# Patient Record
Sex: MALE | Race: OTHER | Employment: OTHER | URBAN - METROPOLITAN AREA
[De-identification: names, ages, dates, MRNs, and addresses within clinical notes are randomized per-mention and may not be internally consistent; named-entity substitution may affect disease eponyms.]

---

## 2017-01-09 ENCOUNTER — OFFICE VISIT (OUTPATIENT)
Dept: FAMILY MEDICINE CLINIC | Age: 82
End: 2017-01-09

## 2017-01-09 VITALS
TEMPERATURE: 97.7 F | DIASTOLIC BLOOD PRESSURE: 70 MMHG | HEART RATE: 56 BPM | SYSTOLIC BLOOD PRESSURE: 110 MMHG | OXYGEN SATURATION: 95 % | WEIGHT: 152.4 LBS | BODY MASS INDEX: 23.17 KG/M2 | RESPIRATION RATE: 18 BRPM

## 2017-01-09 DIAGNOSIS — N40.0 BENIGN NON-NODULAR PROSTATIC HYPERPLASIA WITHOUT LOWER URINARY TRACT SYMPTOMS: ICD-10-CM

## 2017-01-09 DIAGNOSIS — R20.2 NUMBNESS AND TINGLING IN BOTH HANDS: ICD-10-CM

## 2017-01-09 DIAGNOSIS — E11.8 TYPE 2 DIABETES MELLITUS WITH COMPLICATION, WITHOUT LONG-TERM CURRENT USE OF INSULIN (HCC): Primary | ICD-10-CM

## 2017-01-09 DIAGNOSIS — R20.0 NUMBNESS AND TINGLING IN BOTH HANDS: ICD-10-CM

## 2017-01-09 DIAGNOSIS — G25.0 BENIGN ESSENTIAL TREMOR: ICD-10-CM

## 2017-01-09 DIAGNOSIS — E78.5 HYPERLIPIDEMIA, UNSPECIFIED HYPERLIPIDEMIA TYPE: ICD-10-CM

## 2017-01-09 DIAGNOSIS — G56.03 BILATERAL CARPAL TUNNEL SYNDROME: ICD-10-CM

## 2017-01-09 PROCEDURE — 99214 OFFICE O/P EST MOD 30 MIN: CPT | Performed by: FAMILY MEDICINE

## 2017-01-11 LAB
A/G RATIO: 1.4 (CALC) (ref 1–2.5)
ALBUMIN SERPL-MCNC: 4 G/DL (ref 3.6–5.1)
ALP BLD-CCNC: 66 U/L (ref 40–115)
ALT SERPL-CCNC: 14 U/L (ref 9–46)
AST SERPL-CCNC: 17 U/L (ref 10–35)
ATYPICAL LYMPHOCYTE RELATIVE PERCENT: ABNORMAL % (ref 0–10)
BAND NEUTROPHILS: ABNORMAL %
BANDED NEUTROPHILS ABSOLUTE COUNT: ABNORMAL CELLS/UL (ref 0–750)
BASOPHILS ABSOLUTE: 40 CELLS/UL (ref 0–200)
BASOPHILS RELATIVE PERCENT: 0.5 %
BILIRUB SERPL-MCNC: 0.9 MG/DL (ref 0.2–1.2)
BLASTS ABSOLUTE COUNT: ABNORMAL CELLS/UL
BLASTS RELATIVE PERCENT: ABNORMAL %
BUN / CREAT RATIO: 23 (CALC) (ref 6–22)
BUN BLDV-MCNC: 26 MG/DL (ref 7–25)
CALCIUM SERPL-MCNC: 9.2 MG/DL (ref 8.6–10.3)
CHLORIDE BLD-SCNC: 100 MMOL/L (ref 98–110)
CHOLESTEROL, TOTAL: 181 MG/DL (ref 125–200)
CHOLESTEROL/HDL RATIO: 3.2 (CALC)
CHOLESTEROL: 125 MG/DL (CALC)
CO2: 35 MMOL/L (ref 20–31)
COMMENT: ABNORMAL
CREAT SERPL-MCNC: 1.13 MG/DL (ref 0.7–1.11)
EOSINOPHILS ABSOLUTE: 232 CELLS/UL (ref 15–500)
EOSINOPHILS RELATIVE PERCENT: 2.9 %
GFR AFRICAN AMERICAN: 65 ML/MIN/1.73M2
GFR SERPL CREATININE-BSD FRML MDRD: 57 ML/MIN/1.73M2
GLOBULIN: 2.9 G/DL (CALC) (ref 1.9–3.7)
GLUCOSE BLD-MCNC: 110 MG/DL (ref 65–99)
HBA1C MFR BLD: 5.9 % OF TOTAL HGB
HCT VFR BLD CALC: 41.5 % (ref 38.5–50)
HDLC SERPL-MCNC: 56 MG/DL
HEMOGLOBIN: 13.6 G/DL (ref 13.2–17.1)
LDL CHOLESTEROL CALCULATED: 110 MG/DL (CALC)
LYMPHOCYTES ABSOLUTE: 2104 CELLS/UL (ref 850–3900)
LYMPHOCYTES RELATIVE PERCENT: 26.3 %
MCH RBC QN AUTO: 32.9 PG (ref 27–33)
MCHC RBC AUTO-ENTMCNC: 32.8 G/DL (ref 32–36)
MCV RBC AUTO: 100.5 FL (ref 80–100)
METAMYELOCYTES ABSOLUTE COUNT: ABNORMAL CELLS/UL
METAMYELOCYTES RELATIVE PERCENT: ABNORMAL %
MONOCYTES ABSOLUTE: 680 CELLS/UL (ref 200–950)
MONOCYTES RELATIVE PERCENT: 8.5 %
MYELOCYTE PERCENT: ABNORMAL %
MYELOCYTES ABSOLUTE COUNT: ABNORMAL CELLS/UL
NEUTROPHILS ABSOLUTE: 4944 CELLS/UL (ref 1500–7800)
NUCLEATED RED BLOOD CELLS: ABNORMAL /100 WBC
NUCLEATED RED BLOOD CELLS: ABNORMAL CELLS/UL
PDW BLD-RTO: 13.9 % (ref 11–15)
PLATELET # BLD: 141 THOUSAND/UL (ref 140–400)
PMV BLD AUTO: 10.7 FL (ref 7.5–11.5)
POTASSIUM SERPL-SCNC: 4.1 MMOL/L (ref 3.5–5.3)
PROMYELOCYTES ABSOLUTE COUNT: ABNORMAL CELLS/UL
PROMYELOCYTES PERCENT: ABNORMAL %
RBC # BLD: 4.13 MILLION/UL (ref 4.2–5.8)
SEGMENTED NEUTROPHILS RELATIVE PERCENT: 61.8 %
SODIUM BLD-SCNC: 140 MMOL/L (ref 135–146)
TOTAL PROTEIN: 6.9 G/DL (ref 6.1–8.1)
TRIGL SERPL-MCNC: 74 MG/DL
TSH ULTRASENSITIVE: 1.84 MIU/L (ref 0.4–4.5)
VITAMIN D 25-HYDROXY: 39 NG/ML (ref 30–100)
WBC # BLD: 8 THOUSAND/UL (ref 3.8–10.8)

## 2017-02-22 ENCOUNTER — TELEPHONE (OUTPATIENT)
Dept: FAMILY MEDICINE CLINIC | Age: 82
End: 2017-02-22

## 2017-07-10 ENCOUNTER — OFFICE VISIT (OUTPATIENT)
Dept: FAMILY MEDICINE CLINIC | Age: 82
End: 2017-07-10

## 2017-07-10 VITALS
HEART RATE: 47 BPM | WEIGHT: 145 LBS | TEMPERATURE: 96.8 F | HEIGHT: 67 IN | SYSTOLIC BLOOD PRESSURE: 147 MMHG | DIASTOLIC BLOOD PRESSURE: 64 MMHG | OXYGEN SATURATION: 99 % | RESPIRATION RATE: 16 BRPM | BODY MASS INDEX: 22.76 KG/M2

## 2017-07-10 DIAGNOSIS — E11.9 TYPE 2 DIABETES MELLITUS WITHOUT COMPLICATION, WITHOUT LONG-TERM CURRENT USE OF INSULIN (HCC): Primary | ICD-10-CM

## 2017-07-10 DIAGNOSIS — G25.0 BENIGN ESSENTIAL TREMOR: ICD-10-CM

## 2017-07-10 DIAGNOSIS — E78.5 HYPERLIPIDEMIA, UNSPECIFIED HYPERLIPIDEMIA TYPE: ICD-10-CM

## 2017-07-10 DIAGNOSIS — N40.0 BENIGN NON-NODULAR PROSTATIC HYPERPLASIA WITHOUT LOWER URINARY TRACT SYMPTOMS: ICD-10-CM

## 2017-07-10 DIAGNOSIS — G20 PARKINSONISM, UNSPECIFIED PARKINSONISM TYPE (HCC): ICD-10-CM

## 2017-07-10 LAB
CREATININE URINE POCT: NORMAL
MICROALBUMIN/CREAT 24H UR: NEGATIVE MG/G{CREAT}
MICROALBUMIN/CREAT UR-RTO: NORMAL

## 2017-07-10 PROCEDURE — 4040F PNEUMOC VAC/ADMIN/RCVD: CPT | Performed by: FAMILY MEDICINE

## 2017-07-10 PROCEDURE — 99214 OFFICE O/P EST MOD 30 MIN: CPT | Performed by: FAMILY MEDICINE

## 2017-07-10 PROCEDURE — G8420 CALC BMI NORM PARAMETERS: HCPCS | Performed by: FAMILY MEDICINE

## 2017-07-10 PROCEDURE — G8427 DOCREV CUR MEDS BY ELIG CLIN: HCPCS | Performed by: FAMILY MEDICINE

## 2017-07-10 PROCEDURE — 82044 UR ALBUMIN SEMIQUANTITATIVE: CPT | Performed by: FAMILY MEDICINE

## 2017-07-10 PROCEDURE — 1123F ACP DISCUSS/DSCN MKR DOCD: CPT | Performed by: FAMILY MEDICINE

## 2017-07-10 PROCEDURE — 1036F TOBACCO NON-USER: CPT | Performed by: FAMILY MEDICINE

## 2017-07-10 ASSESSMENT — PATIENT HEALTH QUESTIONNAIRE - PHQ9
2. FEELING DOWN, DEPRESSED OR HOPELESS: 0
SUM OF ALL RESPONSES TO PHQ9 QUESTIONS 1 & 2: 0
1. LITTLE INTEREST OR PLEASURE IN DOING THINGS: 0
SUM OF ALL RESPONSES TO PHQ QUESTIONS 1-9: 0

## 2017-07-14 LAB
A/G RATIO: 1.5 (CALC) (ref 1–2.5)
ALBUMIN SERPL-MCNC: 3.8 G/DL (ref 3.6–5.1)
ALP BLD-CCNC: 54 U/L (ref 40–115)
ALT SERPL-CCNC: 11 U/L (ref 9–46)
AST SERPL-CCNC: 16 U/L (ref 10–35)
BILIRUB SERPL-MCNC: 0.8 MG/DL (ref 0.2–1.2)
BUN / CREAT RATIO: 22 (CALC) (ref 6–22)
BUN BLDV-MCNC: 25 MG/DL (ref 7–25)
CALCIUM SERPL-MCNC: 9.1 MG/DL (ref 8.6–10.3)
CHLORIDE BLD-SCNC: 104 MMOL/L (ref 98–110)
CHOLESTEROL, TOTAL: 185 MG/DL (ref 125–200)
CHOLESTEROL/HDL RATIO: 2.9 (CALC)
CHOLESTEROL: 122 MG/DL (CALC)
CO2: 32 MMOL/L (ref 20–31)
CREAT SERPL-MCNC: 1.13 MG/DL (ref 0.7–1.11)
GFR AFRICAN AMERICAN: 65 ML/MIN/1.73M2
GFR SERPL CREATININE-BSD FRML MDRD: 57 ML/MIN/1.73M2
GLOBULIN: 2.6 G/DL (CALC) (ref 1.9–3.7)
GLUCOSE BLD-MCNC: 109 MG/DL (ref 65–99)
HBA1C MFR BLD: 5.8 % OF TOTAL HGB
HDLC SERPL-MCNC: 63 MG/DL
LDL CHOLESTEROL CALCULATED: 110 MG/DL (CALC)
POTASSIUM SERPL-SCNC: 4.2 MMOL/L (ref 3.5–5.3)
SODIUM BLD-SCNC: 141 MMOL/L (ref 135–146)
TOTAL PROTEIN: 6.4 G/DL (ref 6.1–8.1)
TRIGL SERPL-MCNC: 60 MG/DL

## 2017-07-20 LAB — DIABETIC RETINOPATHY: NEGATIVE

## 2017-10-18 ENCOUNTER — OFFICE VISIT (OUTPATIENT)
Dept: ORTHOPEDIC SURGERY | Age: 82
End: 2017-10-18

## 2017-10-18 VITALS — BODY MASS INDEX: 22.76 KG/M2 | WEIGHT: 145 LBS | HEIGHT: 67 IN

## 2017-10-18 DIAGNOSIS — M54.2 NECK PAIN: ICD-10-CM

## 2017-10-18 DIAGNOSIS — M25.512 LEFT SHOULDER PAIN, UNSPECIFIED CHRONICITY: ICD-10-CM

## 2017-10-18 DIAGNOSIS — M50.30 DEGENERATIVE DISC DISEASE, CERVICAL: Primary | ICD-10-CM

## 2017-10-18 PROCEDURE — 4040F PNEUMOC VAC/ADMIN/RCVD: CPT | Performed by: ORTHOPAEDIC SURGERY

## 2017-10-18 PROCEDURE — G8427 DOCREV CUR MEDS BY ELIG CLIN: HCPCS | Performed by: ORTHOPAEDIC SURGERY

## 2017-10-18 PROCEDURE — 1123F ACP DISCUSS/DSCN MKR DOCD: CPT | Performed by: ORTHOPAEDIC SURGERY

## 2017-10-18 PROCEDURE — 1036F TOBACCO NON-USER: CPT | Performed by: ORTHOPAEDIC SURGERY

## 2017-10-18 PROCEDURE — 99203 OFFICE O/P NEW LOW 30 MIN: CPT | Performed by: ORTHOPAEDIC SURGERY

## 2017-10-18 PROCEDURE — 72050 X-RAY EXAM NECK SPINE 4/5VWS: CPT | Performed by: ORTHOPAEDIC SURGERY

## 2017-10-18 PROCEDURE — G8420 CALC BMI NORM PARAMETERS: HCPCS | Performed by: ORTHOPAEDIC SURGERY

## 2017-10-18 PROCEDURE — 73030 X-RAY EXAM OF SHOULDER: CPT | Performed by: ORTHOPAEDIC SURGERY

## 2017-10-18 PROCEDURE — G8484 FLU IMMUNIZE NO ADMIN: HCPCS | Performed by: ORTHOPAEDIC SURGERY

## 2017-10-18 RX ORDER — PROPRANOLOL HYDROCHLORIDE 80 MG/1
TABLET ORAL
COMMUNITY
Start: 2014-05-15 | End: 2018-01-08 | Stop reason: CLARIF

## 2017-10-18 NOTE — LETTER
Patient Name: Nia Samuel MRN: [id number]  DOS: 10/18/2017   Diagnosis:   1. Degenerative disc disease, cervical    2. Left shoulder pain, unspecified chronicity    3. Neck pain                           Goal:  Decrease Pain and/or Swelling Increase ROM and/or Flexibility     Increase Function                           Increase Strength and/or Endurance   Other   Evaluation:  Evaluation and Treatment KT-1000   Isokinetic Exam   Preoperative Eval    Recommended Modalities:  Modalities of Choice      HCVS            Electrical Stimulation      Remove Dressing  Ultrasound        TENS/TNS     Lumbar Traction            Cervical Traction   Phonophoresis         Hot Pack/Cold Pack   PT Treatment, Unlisted Other:  Therapeutic Exercises:    Isometrics    Range of Motion Progressive Exer. Balance Coordination   Flexibility  ROM Limited  Total Hip Replacement   Passive  ROM Full   Total Knee Replacement  Active Assisted    Shoulder Impingement Prog  Active   Tennis Elbow Program   Capsular Shift Regular        Isokinetics      Spine Program   Straight Leg Raises   Gait    Fixation                    Supine                                               Running    Extension    Prone    Throwing    Stabilization    AB     Swiss Ball    AD       Spine Eval    Cervical Eval   Conditioning    Lumbar   Stationary Bike    [place] Track    Lumbar Exer. Stairmaster          Treadmill    Functional Cap  Aquatic Prog.       Return to work    Treatment Program:  Frequency:  1x   2x   3x   4x   5x week/month  Duration:  1   2   3   4   5 week/month  Weight Bearing:  Non   1/4   1/2   3/4   Full  ROM:  Restricted   Full   Follow established:         Other:

## 2017-10-19 ENCOUNTER — HOSPITAL ENCOUNTER (OUTPATIENT)
Dept: PHYSICAL THERAPY | Age: 82
Discharge: HOME | End: 2017-10-31
Admitting: ORTHOPAEDIC SURGERY

## 2017-10-19 PROBLEM — M50.30 DDD (DEGENERATIVE DISC DISEASE), CERVICAL: Status: ACTIVE | Noted: 2017-10-01

## 2017-10-19 NOTE — FLOWSHEET NOTE
[facility]  Orthopaedics and Sports Rehabilitation, [address] [place] [address]  Phone: [phone]   Fax:     [phone]                                                     Physical Therapy Daily Treatment Note  Date:  10/19/2017    Patient Name:  Hayde Kahn    :  10/23/1925  MRN: [id number]  Restrictions/Precautions:    Medical/Treatment Diagnosis Information:  · Diagnosis: M50.30 (ICD-10-CM) - Degenerative disc disease, cervical   · Treatment Diagnosis: Neck pain/left shoulder pain    Insurance/Certification information:  PT Insurance Information: [employer]   Physician Information:  Referring Practitioner: Dr. Pravin Gonzalez of care signed (Y/N):     Date of Patient follow up with Physician:     G-Code (if applicable):      Date G-Code Applied:  10/19/17  PT G-Codes  Functional Assessment Tool Used: NDI  Score: 6% deficit   Functional Limitation: Changing and maintaining body position  Changing and Maintaining Body Position Current Status ():  At least 1 percent but less than 20 percent impaired, limited or restricted  Changing and Maintaining Body Position Goal Status (): 0 percent impaired, limited or restricted    Progress Note:     Next due by: Visit #10      Latex Allergy:  [x]NO      []YES  Preferred Language for Healthcare:   [x]English       []other:    Visit # Insurance Allowable   1 Unlimited      Pain level:  2-610     SUBJECTIVE:  See eval    OBJECTIVE: See eval  Observation:   Test measurements:      RESTRICTIONS/PRECAUTIONS:     Exercises/Interventions:   Exercise/Equipment Resistance/Repetitions Other comments   Stretching/PROM     CROM     Chin tuck Supine 10x5\" hold    UT side bend stretch     Levater scap stretch     Scalene stretch     Pectoral stretch 3x30\"               Isometrics     Retraction     shrugs     Cervical Flexion      Cervical Extension     Cervical sidebending 10x5\" hold bilat              PRE's work      Manual Treatments:  PROM / STM / Oscillations-Mobs:  G-I, II, III, IV (PA's, Inf., Post.)  [x] (23086) Provided manual therapy to mobilize soft tissue/joints of cervical/CT, scapular GHJ and UE for the purpose of decreasing headache, modulating pain, promoting relaxation,  increasing ROM, reducing/eliminating soft tissue swelling/inflammation/restriction, improving soft tissue extensibility and allowing for proper ROM for normal function with self care, reaching, carrying, lifting, house/yardwork, driving/computer work    Modalities:      Charges:  Timed Code Treatment Minutes: 25  TE MT   Total Treatment Minutes: 50  Eval (low)       [x] EVAL (LOW) 57490 (typically 20 minutes face-to-face)  [] EVAL (MOD) 18816 (typically 30 minutes face-to-face)  [] EVAL (HIGH) 91224 (typically 45 minutes face-to-face)  [] RE-EVAL     [x] BK(58608) x  1   [] IONTO  [] NMR (44280) x      [] VASO  [x] Manual (21751) x  1    [] Other:  [] TA x       [] Mech Traction (24786)  [] ES(attended) (22679)      [] ES (un) (08924):     GOALS:  Patient stated goal: Get rid of pain; be able to exercise without pain     Therapist goals for Patient:   Short Term Goals: To be achieved in: 2 weeks  1. Independent in HEP and progression per patient tolerance, in order to prevent re-injury. 2. Patient will have a decrease in pain to facilitate improvement in movement, function, and ADLs as indicated by Functional Deficits. Long Term Goals: To be achieved in: 6 weeks  1. Disability index score of 3% or less for the NDI to assist with reaching prior level of function. 2. Patient will demonstrate increased AROM to [facility] of cervical/thoracic spine to allow for proper joint functioning as indicated by patients Functional Deficits. 3. Patient will demonstrate an increase in postural awareness and control and activation of  Deep cervical stabilizers to allow for proper functional mobility as indicated by patients Functional Deficits.    4. Patient will return to daily functional activities without increased symptoms or restriction. Progression Towards Functional goals:  [] Patient is progressing as expected towards functional goals listed. [] Progression is slowed due to complexities listed. [] Progression has been slowed due to co-morbidities. [x] Plan just implemented, too soon to assess goals progression  [] Other:     ASSESSMENT:  See eval    Treatment/Activity Tolerance:  [x] Patient tolerated treatment well [] Patient limited by fatique  [] Patient limited by pain  [] Patient limited by other medical complications  [] Other:     Patient education :  10/19: Patient education on PT and plan of care including diagnosis, prognosis, treatment goals and options. Patient agrees with discussed POC and treatment and is aware of rehab process. Pt was also educated on clinic layout and use of modalities. Prognosis: [x] Good [] Fair  [] Poor    Patient Requires Follow-up: [x] Yes  [] No    PLAN: 2x per week for 4 weeks.  10/19/17-11/19/17  [] Continue per plan of care [] Alter current plan (see comments)  [x] Plan of care initiated [] Hold pending MD visit [] Discharge    Electronically signed by: Perla Claire PT

## 2017-10-19 NOTE — PLAN OF CARE
1+/3     Functional Mobility/Transfers: WFL     Posture: increased thoracic kyphosis, rounded shoulders and forward head posture     Gait: (include devices/WB status): WNL                        [x] Patient history, allergies, meds reviewed. Medical chart reviewed. See intake form. Review Of Systems (ROS):  [x]Performed Review of systems (Integumentary, CardioPulmonary, Neurological) by intake and observation. Intake form has been scanned into medical record. Patient has been instructed to contact their primary care physician regarding ROS issues if not already being addressed at this time.       Co-morbidities/Complexities (which will affect course of rehabilitation):  [x]None           Arthritic conditions   []Rheumatoid arthritis (M05.9)  []Osteoarthritis (M19.91)   Cardiovascular conditions   []Hypertension (I10)  []Hyperlipidemia (E78.5)  []Angina pectoris (I20)  []Atherosclerosis (I70)   Musculoskeletal conditions   []Disc pathology   []Congenital spine pathologies   []Prior surgical intervention  []Osteoporosis (M81.8)  []Osteopenia (M85.8)   Endocrine conditions   []Hypothyroid (E03.9)  []Hyperthyroid Gastrointestinal conditions   []Constipation (U49.87)   Metabolic conditions   []Morbid obesity (E66.01)  []Diabetes type 1(E10.65) or 2 (E11.65)   []Neuropathy (G60.9)     Pulmonary conditions   []Asthma (J45)  []Coughing   []COPD (J44.9)   Psychological Disorders  []Anxiety (F41.9)  []Depression (F32.9)   []Other:   []Other:          Barriers to/and or personal factors that will affect rehab potential:              [x]Age  [x]Sex              [x]Motivation/Lack of Motivation                        []Co-Morbidities              []Cognitive Function, education/learning barriers              []Environmental, home barriers              []profession/work barriers  []past PT/medical experience  []other:  Justification:     Falls Risk Assessment (30 days):   [x] Falls Risk assessed and no intervention required. [] Falls Risk assessed and Patient requires intervention due to being higher risk   TUG score (>12s at risk):     [] Falls education provided, including       G-Codes:  PT G-Codes  Functional Assessment Tool Used: NDI  Score: 6% deficit   Functional Limitation: Changing and maintaining body position  Changing and Maintaining Body Position Current Status (): At least 1 percent but less than 20 percent impaired, limited or restricted  Changing and Maintaining Body Position Goal Status (): 0 percent impaired, limited or restricted    ASSESSMENT: Patient is a 80year old male who presents with decreased functional mobility and strength of neck and scapular region due to degenerative disc disease of cervical spine. Expectations, POC and diagnosis was reviewed with patient. Patient reports understanding of all education provided and is in agreement with plan of care. Patient will benefit from skilled physical therapy to address outlined problems and goals.      Functional Impairments:     [x]Noted cervical/thoracic/GHJ joint hypomobility   []Noted cervical/thoracic/GHJ joint hypermobility   [x]Decreased cervical/UE functional ROM   []Noted Headache pain aggravated by neck movements with/without dizziness   [x]Abnormal reflexes/sensation/myotomal/dermatomal deficits   [x]Decreased DCF control or ability to hold head up   [x]Decreased RC/scapular/core strength and neuromuscular control    []Decreased UE functional strength   []other:      Functional Activity Limitations (from functional questionnaire and intake)   [x]Reduced ability to tolerate prolonged functional positions   []Reduced ability or difficulty with changes of positions or transfers between positions   [x]Reduced ability to maintain good posture and demonstrate good body mechanics with sitting, bending, and lifting   [x] Reduced ability or tolerance with driving and/or computer work   [x]Reduced ability to perform lifting, reaching, present problem with:  [] no personal factors and/or comorbidities that impact the plan of care;  [x]1-2 personal factors and/or comorbidities that impact the plan of care  []3 personal factors and/or comorbidities that impact the plan of care  [x] An examination of body systems using standardized tests and measures addressing any of the following: body structures and functions (impairments), activity limitations, and/or participation restrictions;:  [] a total of 1-2 or more elements   [] a total of 3 or more elements   [x] a total of 4 or more elements   [x] A clinical presentation with:  [x] stable and/or uncomplicated characteristics   [] evolving clinical presentation with changing characteristics  [] unstable and unpredictable characteristics;   [x] Clinical decision making of [x] low, [] moderate, [] high complexity using standardized patient assessment instrument and/or measurable assessment of functional outcome. [x] EVAL (LOW) 92951 (typically 20 minutes face-to-face)  [] EVAL (MOD) 33049 (typically 30 minutes face-to-face)  [] EVAL (HIGH) 67623 (typically 45 minutes face-to-face)  [] RE-EVAL     PLAN:   Frequency/Duration:  2 days per week for 4 Weeks:  Interventions:  [x]  Therapeutic exercise including: strength training, ROM, for cervical spine,scapula, core and Upper extremity, including postural re-education. [x]  NMR activation and proprioception for Deep cervical flexors, periscapular and RC muscles and Core, including postural re-education. [x]  Manual therapy as indicated for C/T spine, ribs, Soft tissue to include: Dry Needling/IASTM, STM, PROM, Gr I-IV mobilizations, manipulation. [x] Modalities as needed that may include: thermal agents, E-stim, Biofeedback, US, iontophoresis as indicated  [x] Patient education on joint protection, postural re-education, activity modification, progression of HEP.      HEP instruction: Provided written and verbal instructions (see scanned forms)    GOALS:  Patient stated goal: Get rid of pain; be able to exercise without pain     Therapist goals for Patient:   Short Term Goals: To be achieved in: 2 weeks  1. Independent in HEP and progression per patient tolerance, in order to prevent re-injury. 2. Patient will have a decrease in pain to facilitate improvement in movement, function, and ADLs as indicated by Functional Deficits. Long Term Goals: To be achieved in: 6 weeks  1. Disability index score of 3% or less for the NDI to assist with reaching prior level of function. 2. Patient will demonstrate increased AROM to [facility] of cervical/thoracic spine to allow for proper joint functioning as indicated by patients Functional Deficits. 3. Patient will demonstrate an increase in postural awareness and control and activation of  Deep cervical stabilizers to allow for proper functional mobility as indicated by patients Functional Deficits. 4. Patient will return to daily functional activities without increased symptoms or restriction.       Electronically signed by:  Sharon Bernstein PT

## 2017-10-19 NOTE — PROGRESS NOTES
70-year-old man seen for evaluation of left shoulder pain. Patient very pleasant 80year-old gentleman complains of pain in his left arm radiating down the lateral aspect of his arm. He is referred for evaluation of her shoulder. He denies any specific injuries. He says that he feels better when he uses the new pillow that he bought not this helps with his shoulder pain. Also says that when he sits with his head back in a more upright position that his shoulder feels better.     Pain Assessment  Location of Pain: Arm  Location Modifiers: Left  Severity of Pain: 4  Quality of Pain: Aching  Duration of Pain: Persistent  Frequency of Pain: Intermittent  Aggravating Factors:  (certain movement )  Limiting Behavior: Yes  Relieving Factors: Rest  Result of Injury: No  Work-Related Injury: No  Are there other pain locations you wish to document?: No    Past Medical History:   Diagnosis Date    Benign essential tremor     BPH (benign prostatic hyperplasia)     Glaucoma (increased eye pressure)     Hyperlipidemia     Parkinsonism (HCC)     Type II or unspecified type diabetes mellitus without mention of complication, not stated as uncontrolled         Past Surgical History:   Procedure Laterality Date    CATARACT REMOVAL Left     CHOLECYSTECTOMY  2000    HERNIA REPAIR      left inguinal    NASAL FRACTURE SURGERY         Family History   Problem Relation Age of Onset    Other Mother      appendicitis -    [employer] Heart Attack Father 76    Other Sister      mysathia gravis    Glaucoma Brother        Social History     Social History    Marital status:      Spouse name: N/A    Number of children: N/A    Years of education: N/A     Social History Main Topics    Smoking status: Never Smoker    Smokeless tobacco: Never Used    Alcohol use 0.0 oz/week    Drug use: No    Sexual activity: Not Asked     Other Topics Concern    None     Social History Narrative    None       Current Outpatient Prescriptions   Medication Sig Dispense Refill    propranolol (INDERAL) 80 MG tablet       atorvastatin (LIPITOR) 20 MG tablet TAKE 1 TABLET DAILY (Patient taking differently: TAKE 1/2  TABLET DAILY) 90 tablet 0    finasteride (PROSCAR) 5 MG tablet Take 5 mg by mouth daily.  aspirin 81 MG tablet Take 81 mg by mouth daily.  Glucose Blood (BLOOD GLUCOSE TEST STRIPS) STRP Check Blood sugars qd 100 strip 5    Blood Glucose Monitoring Suppl AGUSTINA by Does not apply route. 1 Device 0    Lancets MISC  100 each 5    propranolol (INDERAL LA) 80 MG CR capsule Take 80 mg by mouth 4 times daily.  terazosin (HYTRIN) 2 MG capsule Take 2 mg by mouth nightly.  Multiple Vitamin (MULTI-VITAMIN PO) Take 1 tablet by mouth daily. No current facility-administered medications for this visit. No Known Allergies    Vital signs:  Ht 5' 7\" (1.702 m)   Wt 145 lb (65.8 kg)   BMI 22.71 kg/m²        Neuro: Alert & oriented x 3,  normal,  no focal deficits noted. Normal affect. Eyes: sclera clear  Ears: Normal external ear  Mouth:  No perioral lesions  Pulm: Respirations unlabored and regular  Pulse: Regular rate and rhythm   Skin: Warm, well perfused      Examination of the patient's neck shows limited range of motion with positive Spurling signs bilaterally mild paraspinous muscle tenderness bilaterally. Normal sensation throughout both upper extremity is. No focal lordosis. Right Shoulder Examination:    Inspection: No abnormal swelling. No erythema. No induration. Palpation: No tenderness to palpation. No palpable masses. No crepitus. Acromioclavicular joint: Nontender to palpation. Range of Motion: Full range of motion. Strength: Normal rotator cuff strength. Normal scapulothoracic rhythm. Instability: No anterior or posterior instability. Special Tests: Negative biceps findings. Negative Neer and Kacie signs. Negative apprehension sign.   Negative [place] sign. Negative thrower's sign. Neurovascular: Skin warm well perfused. Normal sensation to light touch. Left Shoulder Examination:    Inspection: No abnormal swelling. No erythema. No induration. Palpation: No tenderness to palpation. No palpable masses. No crepitus. Acromioclavicular joint: Nontender to palpation. Range of Motion: Full range of motion. Strength: Normal rotator cuff strength. Normal scapulothoracic rhythm. Instability: No anterior or posterior instability. Special Tests: Negative biceps findings. Negative Neer and Kacie signs. Negative apprehension sign. Negative [place] sign. Negative thrower's sign. Neurovascular: Skin warm well perfused. Normal sensation to light touch. 3 x-rays were obtained of the right shoulder today. They include an AP, axillary and outlet views. I reviewed the films today. No acute bony abnormalities. AP and lateral x-rays of cervical spine reviewed today show extensive cervical spondylosis with degenerative disc disease. Impression: Advanced cervical spondylosis without evidence of focal deficit. Referred pain to shoulder. Plan: Physical therapy for patient's cervical spondylosis. A couple weeks with this and agrees tight getting any better he'll be referred to a physical medicine rehabilitation specialist for consideration of possible steroid injection. Greater than 50% of the visit was spent counseling the patient. I personally reviewed the patient's pain scale, review of systems, family history, social history, past medical history, allergies and medications. 13 point review of systems was collected today and is included in the medical record. Carey Gongora MD  Sports Medicine, Knee and Shoulder Surgery    This dictation was performed with a verbal recognition program [facility]) and it was checked for errors.   It is possible that there are still dictated errors within this

## 2017-10-23 ENCOUNTER — HOSPITAL ENCOUNTER (OUTPATIENT)
Dept: PHYSICAL THERAPY | Age: 82
Discharge: HOME | End: 2017-10-23

## 2017-10-23 NOTE — FLOWSHEET NOTE
[facility]  Orthopaedics and Sports Rehabilitation, [address] [place] [address], [address] [place] [address]  Phone: [phone]   Fax:     [phone]                                                     Physical Therapy Daily Treatment Note  Date:  10/23/2017    Patient Name:  Hedy Driver    :  10/23/1925  MRN: [id number]  Restrictions/Precautions:    Medical/Treatment Diagnosis Information:  · Diagnosis: M50.30 (ICD-10-CM) - Degenerative disc disease, cervical   · Treatment Diagnosis: Neck pain/left shoulder pain    Insurance/Certification information:  PT Insurance Information: [employer]   Physician Information:  Referring Practitioner: Dr. Swetha Galindo Way of care signed (Y/N):     Date of Patient follow up with Physician:     G-Code (if applicable):      Date G-Code Applied:  10/19/17       Progress Note:     Next due by: Visit #10      Latex Allergy:  [x]NO      []YES  Preferred Language for Healthcare:   [x]English       []other:    Visit # Insurance Allowable   2  10/23 Unlimited      Pain level:  2/10 10/23    SUBJECTIVE:  Pt reports that he is still having pain at the elbow, but less tingling radiating into arm. 10/23    OBJECTIVE: 10/23  Observation: Noted cervical spine SB to R in resting posture with noted increases in R sided cervical musculature.  10/23  Test measurements:  PIVM assessment shows significant hypomobility in cervical spine all levels 10/23    RESTRICTIONS/PRECAUTIONS:     Exercises/Interventions:   Exercise/Equipment Resistance/Repetitions Other comments   Stretching/PROM     CROM     Chin tuck Supine 10x5\" hold    UT side bend stretch     Levater scap stretch     TS Extension/ Rotation with cane NPV if tolerated    Pectoral stretch 3x30\"     TS Extension x20 over chair 10/23   CS Circles x10 CW CCW Discontinued part way due to return of elbow symptoms 10/23   Isometrics     CS Retraction Seated 10x5\" hold   10/23   shrugs     Cervical Flexion Cervical Extension     Cervical sidebending 10x5\" hold bilat              PRE's     External Rotation     Internal Rotation     Serratus     Biceps     Triceps     Shrugs     Horizontal Abd with ER     Reverse Flys     EXT     Flexion     Abduction          Cable Column/Theraband     Scapular Retraction 3x10 no resistance Trial resistance NPV    External Rotation     Internal Rotation     Ext     TRIC     Lats     Shrugs     Flex     BIC     PNF          Manual intervention Manual distraction/ NG ulnar/radial/medial 10'                   Therapeutic Exercise and NMR EXR  [x] (20730) Provided verbal/tactile cueing for activities related to strengthening, flexibility, endurance, ROM  for improvements in cervical, postural, scapular, scapulothoracic and UE control with self care, reaching, carrying, lifting, house/yardwork, driving/computer work. [x] (62382) Provided verbal/tactile cueing for activities related to improving balance, coordination, kinesthetic sense, posture, motor skill, proprioception  to assist with cervical, scapular, scapulothoracic and UE control with self care, reaching, carrying, lifting, house/yardwork, driving/computer work. Therapeutic Activities:    [x] (86468 or 98928) Provided verbal/tactile cueing for activities related to improving balance, coordination, kinesthetic sense, posture, motor skill, proprioception and motor activation to allow for proper function of cervical, scapular, scapulothoracic and UE control with self care, carrying, lifting, driving/computer work.      Home Exercise Program:    [x] (00419) Reviewed/Progressed HEP activities related to strengthening, flexibility, endurance, ROM of cervical, scapular, scapulothoracic and UE control with self care, reaching, carrying, lifting, house/yardwork, driving/computer work  [] (34758) Reviewed/Progressed HEP activities related to improving balance, coordination, kinesthetic sense, posture, motor skill, proprioception of activation of  Deep cervical stabilizers to allow for proper functional mobility as indicated by patients Functional Deficits. 4. Patient will return to daily functional activities without increased symptoms or restriction. Progression Towards Functional goals:  [] Patient is progressing as expected towards functional goals listed. [] Progression is slowed due to complexities listed. [] Progression has been slowed due to co-morbidities. [x] Plan just implemented, too soon to assess goals progression  [] Other:     ASSESSMENT:  Pt tolerated treatment well with a notable decrease in symptoms with posture correction and seated retractions. Pt reported return of symptoms with cervical spine circles and Ulnar NG warranting discontinued intervention. POC to continue to address postural corrections while decreasing and managing radicular symptoms. 10/23. Treatment/Activity Tolerance:  [x] Patient tolerated treatment well [] Patient limited by fatique  [] Patient limited by pain  [] Patient limited by other medical complications  [] Other:     Patient education :  10/19: Patient education on PT and plan of care including diagnosis, prognosis, treatment goals and options. Patient agrees with discussed POC and treatment and is aware of rehab process. Pt was also educated on clinic layout and use of modalities. 10/23- Pt educated on maintaining proper posture and checking in as often as he can throughout the day to assess his posture and make corrections. Prognosis: [x] Good [] Fair  [] Poor    Patient Requires Follow-up: [x] Yes  [] No    PLAN: 2x per week for 4 weeks.  10/19/17-11/19/17  [x] Continue per plan of care [] Alter current plan (see comments)  [] Plan of care initiated [] Hold pending MD visit [] Discharge    Electronically signed by: Pb Pang PT     Mis [place], Student Physical Therapist  Therapist was present, directed the patient's care, made skilled judgement, and was responsible for assessment and treatment of the patient.

## 2017-10-25 ENCOUNTER — HOSPITAL ENCOUNTER (OUTPATIENT)
Dept: PHYSICAL THERAPY | Age: 82
Discharge: HOME | End: 2017-10-25

## 2017-10-25 NOTE — FLOWSHEET NOTE
Cervical sidebending 10x5\" hold bilat seated and supine attempted *discontinued due to radicular pain in all positions 10/25             PRE's     External Rotation     Internal Rotation     Serratus     Biceps     Triceps     Shrugs     Horizontal Abd with ER     Reverse Flys     EXT     Flexion     Abduction          Cable Column/Theraband        External Rotation     Internal Rotation     Ext     TRIC     Lats     Shrugs     Flex     BIC     PNF          Manual intervention Manual [id number]                   Therapeutic Exercise and NMR EXR  [x] (19716) Provided verbal/tactile cueing for activities related to strengthening, flexibility, endurance, ROM  for improvements in cervical, postural, scapular, scapulothoracic and UE control with self care, reaching, carrying, lifting, house/yardwork, driving/computer work. [x] (63990) Provided verbal/tactile cueing for activities related to improving balance, coordination, kinesthetic sense, posture, motor skill, proprioception  to assist with cervical, scapular, scapulothoracic and UE control with self care, reaching, carrying, lifting, house/yardwork, driving/computer work. Therapeutic Activities:    [x] (38443 or 66905) Provided verbal/tactile cueing for activities related to improving balance, coordination, kinesthetic sense, posture, motor skill, proprioception and motor activation to allow for proper function of cervical, scapular, scapulothoracic and UE control with self care, carrying, lifting, driving/computer work.      Home Exercise Program:    [x] (80320) Reviewed/Progressed HEP activities related to strengthening, flexibility, endurance, ROM of cervical, scapular, scapulothoracic and UE control with self care, reaching, carrying, lifting, house/yardwork, driving/computer work  [] (93300) Reviewed/Progressed HEP activities related to improving balance, coordination, kinesthetic sense, posture, motor skill, proprioception of cervical, scapular, directed the patient's care, made skilled judgement, and was responsible for assessment and treatment of the patient.

## 2017-10-30 ENCOUNTER — HOSPITAL ENCOUNTER (OUTPATIENT)
Dept: PHYSICAL THERAPY | Age: 82
Discharge: HOME | End: 2017-10-30

## 2017-10-30 NOTE — FLOWSHEET NOTE
PRE's     External Rotation     Internal Rotation     Serratus     Biceps     Triceps     Shrugs     Horizontal Abd with ER     Reverse Flys     EXT     Flexion     Abduction          Cable Column/Theraband     Scapular Retraction 3x10 Red TB ^10/30   External Rotation     Internal Rotation     Ext 2x10 Red Added 10/30   TRIC     Lats     Shrugs     Flex     BIC     PNF          Manual intervention Manual distraction1/2nd rib mob 10'                   Therapeutic Exercise and NMR EXR  [x] (20854) Provided verbal/tactile cueing for activities related to strengthening, flexibility, endurance, ROM  for improvements in cervical, postural, scapular, scapulothoracic and UE control with self care, reaching, carrying, lifting, house/yardwork, driving/computer work. [x] (94007) Provided verbal/tactile cueing for activities related to improving balance, coordination, kinesthetic sense, posture, motor skill, proprioception  to assist with cervical, scapular, scapulothoracic and UE control with self care, reaching, carrying, lifting, house/yardwork, driving/computer work. Therapeutic Activities:    [x] (30848 or 73758) Provided verbal/tactile cueing for activities related to improving balance, coordination, kinesthetic sense, posture, motor skill, proprioception and motor activation to allow for proper function of cervical, scapular, scapulothoracic and UE control with self care, carrying, lifting, driving/computer work.      Home Exercise Program:    [x] (44401) Reviewed/Progressed HEP activities related to strengthening, flexibility, endurance, ROM of cervical, scapular, scapulothoracic and UE control with self care, reaching, carrying, lifting, house/yardwork, driving/computer work  [] (51917) Reviewed/Progressed HEP activities related to improving balance, coordination, kinesthetic sense, posture, motor skill, proprioception of cervical, scapular, scapulothoracic and UE control with self care, reaching, carrying, lifting, house/yardwork, driving/computer work      Manual Treatments:  PROM / STM / Oscillations-Mobs:  G-I, II, III, IV (PA's, Inf., Post.)  [x] (35511) Provided manual therapy to mobilize soft tissue/joints of cervical/CT, scapular GHJ and UE for the purpose of decreasing headache, modulating pain, promoting relaxation,  increasing ROM, reducing/eliminating soft tissue swelling/inflammation/restriction, improving soft tissue extensibility and allowing for proper ROM for normal function with self care, reaching, carrying, lifting, house/yardwork, driving/computer work    Modalities:      Charges:  Timed Code Treatment Minutes: 40  TE MT TA   Total Treatment Minutes: 3:00-3:45       [] EVAL (LOW) 69393 (typically 20 minutes face-to-face)  [] EVAL (MOD) 32144 (typically 30 minutes face-to-face)  [] EVAL (HIGH) 33321 (typically 45 minutes face-to-face)  [] RE-EVAL     [x] JK(74500) x  1   [] IONTO  [] NMR (27266) x      [] VASO  [x] Manual (22264) x  1    [] Other:  [x] TA x  1    [] Mech Traction (16504)  [] ES(attended) (56080)      [] ES (un) (80318):     GOALS:  Patient stated goal: Get rid of pain; be able to exercise without pain     Therapist goals for Patient:   Short Term Goals: To be achieved in: 2 weeks  1. Independent in HEP and progression per patient tolerance, in order to prevent re-injury. 2. Patient will have a decrease in pain to facilitate improvement in movement, function, and ADLs as indicated by Functional Deficits. Long Term Goals: To be achieved in: 6 weeks  1. Disability index score of 3% or less for the NDI to assist with reaching prior level of function. 2. Patient will demonstrate increased AROM to [facility] of cervical/thoracic spine to allow for proper joint functioning as indicated by patients Functional Deficits.    3. Patient will demonstrate an increase in postural awareness and control and activation of  Deep cervical stabilizers to allow for proper functional mobility as indicated by patients Functional Deficits. 4. Patient will return to daily functional activities without increased symptoms or restriction. Progression Towards Functional goals:  [] Patient is progressing as expected towards functional goals listed. [] Progression is slowed due to complexities listed. [] Progression has been slowed due to co-morbidities. [x] Plan just implemented, too soon to assess goals progression  [] Other:     ASSESSMENT:  Pt tolerated treatment well with a significant reduction in muscle tension noted after manual intervention. In addition he tolerated the addition of resistance exercises with notable fatigue at the end resulting in only 2 sets of extensions. 10/30    Treatment/Activity Tolerance:  [x] Patient tolerated treatment well [] Patient limited by fatique  [] Patient limited by pain  [] Patient limited by other medical complications  [] Other:     Patient education :  10/19: Patient education on PT and plan of care including diagnosis, prognosis, treatment goals and options. Patient agrees with discussed POC and treatment and is aware of rehab process. Pt was also educated on clinic layout and use of modalities. 10/23- Pt educated on maintaining proper posture and checking in as often as he can throughout the day to assess his posture and make corrections. 10/25- Pt instructed to no longer continue with SB ISO at home and Tspine rotations were added to HEP. Importance of posture was reiterated. Prognosis: [x] Good [] Fair  [] Poor    Patient Requires Follow-up: [x] Yes  [] No    PLAN: 2x per week for 4 weeks.  10/19/17-11/19/17  [x] Continue per plan of care [] Alter current plan (see comments)  [] Plan of care initiated [] Hold pending MD visit [] Discharge    Electronically signed by: Kerrie Lugo, PT     Noman Queen, Student Physical Therapist  Therapist was present, directed the patient's care, made skilled judgement, and was responsible for assessment and treatment of the patient.

## 2017-11-01 ENCOUNTER — HOSPITAL ENCOUNTER (OUTPATIENT)
Dept: PHYSICAL THERAPY | Age: 82
Discharge: HOME | End: 2017-11-01
Admitting: ORTHOPAEDIC SURGERY

## 2017-11-01 ENCOUNTER — HOSPITAL ENCOUNTER (OUTPATIENT)
Dept: PHYSICAL THERAPY | Age: 82
Discharge: HOME | End: 2017-11-30
Attending: ORTHOPAEDIC SURGERY | Admitting: ORTHOPAEDIC SURGERY

## 2017-11-01 NOTE — FLOWSHEET NOTE
[facility]  Orthopaedics and Sports Rehabilitation, [address] [place] [address], [address] [place] [address]  Phone: [phone]   Fax:     [phone]                                                     Physical Therapy Daily Treatment Note  Date:  2017    Patient Name:  Quan Coelho    :  10/23/1925  MRN: [id number]  Restrictions/Precautions:    Medical/Treatment Diagnosis Information:  · Diagnosis: M50.30 (ICD-10-CM) - Degenerative disc disease, cervical   · Treatment Diagnosis: Neck pain/left shoulder pain    Insurance/Certification information:  PT Insurance Information: [employer]   Physician Information:  Referring Practitioner: Dr. Caroline Salinas of care signed (Y/N):     Date of Patient follow up with Physician:     G-Code (if applicable):      Date G-Code Applied:  10/19/17       Progress Note:     Next due by: Visit #10      Latex Allergy:  [x]NO      []YES  Preferred Language for Healthcare:   [x]English       []other:    Visit # Insurance Allowable   5   Unlimited      Pain level:  0/10     SUBJECTIVE:  Pt reports he is feeling a little stiff this morning but still feels general improvement and has little sensation down his arm. He has a cold and that is making him feel lousy      OBJECTIVE:   Observation: Noted cervical spine SB to R in resting posture with noted increases in R sided cervical musculature.    Test measurements:  Manual traction in conjunction with mild L side bending proved maked reduction in resting Cspine position and .     RESTRICTIONS/PRECAUTIONS:     Exercises/Interventions:   Exercise/Equipment Resistance/Repetitions Other comments   Stretching/PROM     CROM Flexion and SB x10 each Added    UT side bend stretch     Levater scap stretch     TS Extension/ Rotation with cane x30 rotations Added 10/25   Pectoral stretch 3x30\"  Reviewed technique with pt today to decrease elbow symptoms 10/30   TS Extension x20 over chair 10/23   Isometrics     shrugs     Cervical Flexion      Cervical Extension               PRE's     External Rotation     Internal Rotation     Serratus     Biceps     Triceps     Shrugs     Horizontal Abd with ER     Reverse Flys     EXT     Flexion     Abduction          Cable Column/Theraband     Scapular Retraction 3x10 Red TB ^10/30   External Rotation     Internal Rotation     Ext 3x10 Red ^10/30   TRIC     Lats     Shrugs     Flex     BIC     PNF          Manual intervention Manual distraction 10'                   Therapeutic Exercise and NMR EXR  [x] (32001) Provided verbal/tactile cueing for activities related to strengthening, flexibility, endurance, ROM  for improvements in cervical, postural, scapular, scapulothoracic and UE control with self care, reaching, carrying, lifting, house/yardwork, driving/computer work. [x] (92526) Provided verbal/tactile cueing for activities related to improving balance, coordination, kinesthetic sense, posture, motor skill, proprioception  to assist with cervical, scapular, scapulothoracic and UE control with self care, reaching, carrying, lifting, house/yardwork, driving/computer work. Therapeutic Activities:    [x] (30417 or 36151) Provided verbal/tactile cueing for activities related to improving balance, coordination, kinesthetic sense, posture, motor skill, proprioception and motor activation to allow for proper function of cervical, scapular, scapulothoracic and UE control with self care, carrying, lifting, driving/computer work.      Home Exercise Program:    [x] (90742) Reviewed/Progressed HEP activities related to strengthening, flexibility, endurance, ROM of cervical, scapular, scapulothoracic and UE control with self care, reaching, carrying, lifting, house/yardwork, driving/computer work  [] (90754) Reviewed/Progressed HEP activities related to improving balance, coordination, kinesthetic sense, posture, motor skill, proprioception of cervical, scapular, scapulothoracic and UE control with self care, reaching, carrying, lifting, house/yardwork, driving/computer work      Manual Treatments:  PROM / STM / Oscillations-Mobs:  G-I, II, III, IV (Yadira, Inf., Post.)  [x] (09121) Provided manual therapy to mobilize soft tissue/joints of cervical/CT, scapular GHJ and UE for the purpose of decreasing headache, modulating pain, promoting relaxation,  increasing ROM, reducing/eliminating soft tissue swelling/inflammation/restriction, improving soft tissue extensibility and allowing for proper ROM for normal function with self care, reaching, carrying, lifting, house/yardwork, driving/computer work    Modalities:      Charges:  Timed Code Treatment Minutes: 35  TE TA   Total Treatment Minutes: 11:31-12:15       [] EVAL (LOW) 42852 (typically 20 minutes face-to-face)  [] EVAL (MOD) 64388 (typically 30 minutes face-to-face)  [] EVAL (HIGH) 30614 (typically 45 minutes face-to-face)  [] RE-EVAL     [x] (14901) x  1   [] IONTO  [] NMR (45357) x      [] VASO  [] Manual (74562) x       [] Other:   [x] TA x  1    [] Mech Traction (24679)  [] ES(attended) (90442)      [] ES (un) (80989):     GOALS:  Patient stated goal: Get rid of pain; be able to exercise without pain     Therapist goals for Patient:   Short Term Goals: To be achieved in: 2 weeks  1. Independent in HEP and progression per patient tolerance, in order to prevent re-injury. 2. Patient will have a decrease in pain to facilitate improvement in movement, function, and ADLs as indicated by Functional Deficits. Long Term Goals: To be achieved in: 6 weeks  1. Disability index score of 3% or less for the NDI to assist with reaching prior level of function. 2. Patient will demonstrate [facility]/thoracic spine to allow for proper joint functioning as indicated by patients Functional Deficits.    3. Patient will demonstrate an increase in postural awareness and control and activation of  Deep cervical stabilizers to allow for proper functional mobility as indicated by patients Functional Deficits. 4. Patient will return to daily functional activities without increased symptoms or restriction. Progression Towards Functional goals:  [] Patient is progressing as expected towards functional goals listed. [] Progression is slowed due to complexities listed. [] Progression has been slowed due to co-morbidities. [x] Plan just implemented, too soon to assess goals progression  [] Other:     ASSESSMENT:  Pt tolerated treatment well with a significant reduction in muscle tension and improvement is cervical positioning noted after manual intervention. No reports of pain or sensation through the entirety of treatment session. Pt continues to tolerate resistance exercise. 11/1    Treatment/Activity Tolerance:  [x] Patient tolerated treatment well [] Patient limited by fatique  [] Patient limited by pain  [] Patient limited by other medical complications  [] Other:     Patient education :  10/19: Patient education on PT and plan of care including diagnosis, prognosis, treatment goals and options. Patient agrees with discussed POC and treatment and is aware of rehab process. Pt was also educated on clinic layout and use of modalities. 10/23- Pt educated on maintaining proper posture and checking in as often as he can throughout the day to assess his posture and make corrections. 10/25- Pt instructed to no longer continue with SB ISO at home and Tspine rotations were added to HEP. Importance of posture was reiterated. Prognosis: [x] Good [] Fair  [] Poor    Patient Requires Follow-up: [x] Yes  [] No    PLAN: 2x per week for 4 weeks.  10/19/17-11/19/17 Pt instructed to FU once next week to check in since he has been progressing well  [x] Continue per plan of care [] Alter current plan (see comments)  [] Plan of care initiated [] Hold pending MD visit [] Discharge    Electronically signed by: Rizwan Hahn, PT Niharika Donahue, Student Physical Therapist  Therapist was present, directed the patient's care, made skilled judgement, and was responsible for assessment and treatment of the patient.

## 2017-11-08 ENCOUNTER — HOSPITAL ENCOUNTER (OUTPATIENT)
Dept: PHYSICAL THERAPY | Age: 82
Discharge: HOME | End: 2017-11-08
Admitting: ORTHOPAEDIC SURGERY

## 2017-11-08 NOTE — FLOWSHEET NOTE
driving/computer work      Manual Treatments:  PROM / STM / Oscillations-Mobs:  G-I, II, III, IV (PA's, Inf., Post.)  [x] (35088) Provided manual therapy to mobilize soft tissue/joints of cervical/CT, scapular GHJ and UE for the purpose of decreasing headache, modulating pain, promoting relaxation,  increasing ROM, reducing/eliminating soft tissue swelling/inflammation/restriction, improving soft tissue extensibility and allowing for proper ROM for normal function with self care, reaching, carrying, lifting, house/yardwork, driving/computer work    Modalities:      Charges:  Timed Code Treatment Minutes: 25  TE MT    Total Treatment Minutes: 4750-6804       [] EVAL (LOW) 95741 (typically 20 minutes face-to-face)  [] EVAL (MOD) 08774 (typically 30 minutes face-to-face)  [] EVAL (HIGH) 84605 (typically 45 minutes face-to-face)  [] RE-EVAL     [x] HB(18243) x  1   [] IONTO  [] NMR (20986) x      [] VASO  [x] Manual (83430) x  1    [] Other:   [] TA x       [] Mech Traction (30404)  [] ES(attended) (73644)      [] ES (un) (60117):     GOALS:  Patient stated goal: Get rid of pain; be able to exercise without pain     Therapist goals for Patient:   Short Term Goals: To be achieved in: 2 weeks  1. Independent in HEP and progression per patient tolerance, in order to prevent re-injury. 2. Patient will have a decrease in pain to facilitate improvement in movement, function, and ADLs as indicated by Functional Deficits. Long Term Goals: To be achieved in: 6 weeks  1. Disability index score of 3% or less for the NDI to assist with reaching prior level of function. 2. Patient will demonstrate increased AROM to [facility] of cervical/thoracic spine to allow for proper joint functioning as indicated by patients Functional Deficits.    3. Patient will demonstrate an increase in postural awareness and control and activation of  Deep cervical stabilizers to allow for proper functional mobility as indicated by patients Functional Deficits. 4. Patient will return to daily functional activities without increased symptoms or restriction. Progression Towards Functional goals:  [] Patient is progressing as expected towards functional goals listed. [] Progression is slowed due to complexities listed. [] Progression has been slowed due to co-morbidities. [x] Plan just implemented, too soon to assess goals progression  [] Other:     ASSESSMENT:  Pt tolerated treatment well with no reports of pain or sensation through the entirety of treatment session. 11/8    Treatment/Activity Tolerance:  [x] Patient tolerated treatment well [] Patient limited by fatique  [] Patient limited by pain  [] Patient limited by other medical complications  [] Other:     Patient education :  10/19: Patient education on PT and plan of care including diagnosis, prognosis, treatment goals and options. Patient agrees with discussed POC and treatment and is aware of rehab process. Pt was also educated on clinic layout and use of modalities. 10/23- Pt educated on maintaining proper posture and checking in as often as he can throughout the day to assess his posture and make corrections. 10/25- Pt instructed to no longer continue with SB ISO at home and Tspine rotations were added to HEP. Importance of posture was reiterated. Prognosis: [x] Good [] Fair  [] Poor    Patient Requires Follow-up: [x] Yes  [] No    PLAN: 2x per week for 4 weeks. 10/19/17-11/19/17 Pt instructed to FU once next week to check in since he has been progressing well.    [x] Continue per plan of care [] Alter current plan (see comments)  [] Plan of care initiated [] Hold pending MD visit [] Discharge    Electronically signed by: Sophie Brwon, PT

## 2017-12-01 ENCOUNTER — HOSPITAL ENCOUNTER (OUTPATIENT)
Dept: PHYSICAL THERAPY | Age: 82
Discharge: HOME | End: 2017-12-31
Attending: ORTHOPAEDIC SURGERY | Admitting: ORTHOPAEDIC SURGERY

## 2018-01-08 ENCOUNTER — OFFICE VISIT (OUTPATIENT)
Dept: FAMILY MEDICINE CLINIC | Age: 83
End: 2018-01-08

## 2018-01-08 VITALS
RESPIRATION RATE: 16 BRPM | DIASTOLIC BLOOD PRESSURE: 80 MMHG | BODY MASS INDEX: 22.24 KG/M2 | OXYGEN SATURATION: 98 % | SYSTOLIC BLOOD PRESSURE: 122 MMHG | WEIGHT: 142 LBS | HEART RATE: 50 BPM

## 2018-01-08 DIAGNOSIS — E78.5 HYPERLIPIDEMIA, UNSPECIFIED HYPERLIPIDEMIA TYPE: ICD-10-CM

## 2018-01-08 DIAGNOSIS — G20 PARKINSONISM, UNSPECIFIED PARKINSONISM TYPE (HCC): ICD-10-CM

## 2018-01-08 DIAGNOSIS — E11.9 TYPE 2 DIABETES MELLITUS WITHOUT COMPLICATION, WITHOUT LONG-TERM CURRENT USE OF INSULIN (HCC): Primary | ICD-10-CM

## 2018-01-08 DIAGNOSIS — G20 PARKINSON DISEASE (HCC): ICD-10-CM

## 2018-01-08 DIAGNOSIS — M50.30 DDD (DEGENERATIVE DISC DISEASE), CERVICAL: ICD-10-CM

## 2018-01-08 DIAGNOSIS — G25.0 BENIGN ESSENTIAL TREMOR: ICD-10-CM

## 2018-01-08 PROCEDURE — 4040F PNEUMOC VAC/ADMIN/RCVD: CPT | Performed by: FAMILY MEDICINE

## 2018-01-08 PROCEDURE — 1123F ACP DISCUSS/DSCN MKR DOCD: CPT | Performed by: FAMILY MEDICINE

## 2018-01-08 PROCEDURE — G8420 CALC BMI NORM PARAMETERS: HCPCS | Performed by: FAMILY MEDICINE

## 2018-01-08 PROCEDURE — 99214 OFFICE O/P EST MOD 30 MIN: CPT | Performed by: FAMILY MEDICINE

## 2018-01-08 PROCEDURE — G8484 FLU IMMUNIZE NO ADMIN: HCPCS | Performed by: FAMILY MEDICINE

## 2018-01-08 PROCEDURE — G8427 DOCREV CUR MEDS BY ELIG CLIN: HCPCS | Performed by: FAMILY MEDICINE

## 2018-01-08 PROCEDURE — 1036F TOBACCO NON-USER: CPT | Performed by: FAMILY MEDICINE

## 2018-01-08 RX ORDER — ATORVASTATIN CALCIUM 20 MG/1
TABLET, FILM COATED ORAL
Qty: 90 TABLET | Refills: 1 | Status: SHIPPED | OUTPATIENT
Start: 2018-01-08 | End: 2020-01-08

## 2018-01-08 NOTE — PROGRESS NOTES
SUBJECTIVE:  80 y.o.. male  for follow up of diabetes, hypertension, and hypercholesterolemia. Diabetic Review of Systems - medication compliance: compliant most of the time, diabetic diet compliance: compliant most of the time, home glucose monitoring: fasting values range - not checking, further diabetic ROS: no polyuria or polydipsia, no chest pain, dyspnea or TIA's, no numbness, tingling or pain in extremities, last eye exam approximately 7/17. He moved from his house to [place] in 9/1/17 . He planned his house in 1 day. He is adjusting okay . He commented on it being older people. He does go to the gym 4d/ week . He dries and is 1/4 of mile and walks on treadmill and does weights. He used to go to a gym. He is finding it to be a friendly environment. His wife is enjoying the move. His kids are relieved . He has 2 children in the area. 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 He gets 1/2 money back if leaves after 10 years. It was newly remodeled. He is taking Propanolol 80 mg qid for tremor. He is able to play golf without problems. He takes cart . He is to see Neurologist again. He has signed up for golf outing in [place] for 4 days in 6/17.  25 guys go on the [employer] group. He stopped Lipitor in early 6/17. He denies problems with the Lipitor. He just is \"anti- medication\". He was having tingling to arm, but it has resolved. Doing neck exercises fairly regularly he was given through Physical Therapy . He had seen Dr. Brandon Coulter prior to Physical Therapy . Current Outpatient Prescriptions   Medication Sig Dispense Refill    atorvastatin (LIPITOR) 20 MG tablet TAKE 1 TABLET DAILY (Patient taking differently: TAKE 1/2  TABLET DAILY) 90 tablet 0    aspirin 81 MG tablet Take 81 mg by mouth daily.  Multiple Vitamin (MULTI-VITAMIN PO) Take 1 tablet by mouth daily.         propranolol (INDERAL) 80 MG tablet       finasteride (PROSCAR) 5 MG Parkinsonism type ([facility] [address]        -    Followed by Neurologist.   DDD (degenerative disc disease), cervical        -    Stable. PLAN:  See orders for this visit as documented in the electronic medical record. Issues reviewed with her: low cholesterol diet, weight control and daily exercise discussed, home glucose monitoring emphasized, all medications, side effects and compliance discussed carefully, foot care discussed and Podiatry visits discussed, annual eye examinations at Ophthalmology discussed, glycohemoglobin and other lab monitoring discussed and long term diabetic complications discussed. Goals:   1) Blood pressure < 130/80  2) HGA1C ideal less than 6.5, at least less than 7.0  3) LDL cholesterol < 100  4) Exercise 150 minutes a week   5) Dilated eye exam by an optometrist or ophthalmologist once a year  6) Fasting blood sugar < 110  7) Glucose 2 hours after meal < 140  8) Aspirin 81 mg once a day  9) BMI (Body Mass Index) ideal < 25, at least less than 30. Weight loss of even 10 to 15 pounds is effective in improving diabetes  9) Total fat intake to less than 60 grams per day and saturated fat to less than 20 grams per day. 10) Diabetic education and diabetic nutrition classes. [phone]) Doctor visits every 3 months. Follow up 6 months/ prn.

## 2018-01-10 LAB
A/G RATIO: 1.4 (CALC) (ref 1–2.5)
ALBUMIN SERPL-MCNC: 3.9 G/DL (ref 3.6–5.1)
ALP BLD-CCNC: 53 U/L (ref 40–115)
ALT SERPL-CCNC: 12 U/L (ref 9–46)
AST SERPL-CCNC: 16 U/L (ref 10–35)
BILIRUB SERPL-MCNC: 0.9 MG/DL (ref 0.2–1.2)
BUN / CREAT RATIO: ABNORMAL (CALC) (ref 6–22)
BUN BLDV-MCNC: 22 MG/DL (ref 7–25)
CALCIUM SERPL-MCNC: 9.2 MG/DL (ref 8.6–10.3)
CHLORIDE BLD-SCNC: 105 MMOL/L (ref 98–110)
CHOLESTEROL, TOTAL: 199 MG/DL
CHOLESTEROL/HDL RATIO: 3.3 (CALC)
CHOLESTEROL: 139 MG/DL (CALC)
CO2: 32 MMOL/L (ref 20–31)
CREAT SERPL-MCNC: 1.06 MG/DL (ref 0.7–1.11)
GFR AFRICAN AMERICAN: 70 ML/MIN/1.73M2
GFR SERPL CREATININE-BSD FRML MDRD: 61 ML/MIN/1.73M2
GLOBULIN: 2.8 G/DL (CALC) (ref 1.9–3.7)
GLUCOSE BLD-MCNC: 120 MG/DL (ref 65–99)
HBA1C MFR BLD: 5.4 % OF TOTAL HGB
HDLC SERPL-MCNC: 60 MG/DL
LDL CHOLESTEROL CALCULATED: 121 MG/DL (CALC)
POTASSIUM SERPL-SCNC: 4 MMOL/L (ref 3.5–5.3)
SODIUM BLD-SCNC: 140 MMOL/L (ref 135–146)
TOTAL PROTEIN: 6.7 G/DL (ref 6.1–8.1)
TRIGL SERPL-MCNC: 84 MG/DL

## 2018-06-21 LAB — DIABETIC RETINOPATHY: NEGATIVE

## 2018-07-09 ENCOUNTER — OFFICE VISIT (OUTPATIENT)
Dept: FAMILY MEDICINE CLINIC | Age: 83
End: 2018-07-09

## 2018-07-09 VITALS
OXYGEN SATURATION: 98 % | WEIGHT: 139.6 LBS | HEART RATE: 50 BPM | HEIGHT: 67 IN | BODY MASS INDEX: 21.91 KG/M2 | DIASTOLIC BLOOD PRESSURE: 61 MMHG | RESPIRATION RATE: 12 BRPM | SYSTOLIC BLOOD PRESSURE: 136 MMHG

## 2018-07-09 DIAGNOSIS — Z23 NEED FOR SHINGLES VACCINE: ICD-10-CM

## 2018-07-09 DIAGNOSIS — E11.9 TYPE 2 DIABETES MELLITUS WITHOUT COMPLICATION, WITHOUT LONG-TERM CURRENT USE OF INSULIN (HCC): Primary | ICD-10-CM

## 2018-07-09 DIAGNOSIS — M50.30 DDD (DEGENERATIVE DISC DISEASE), CERVICAL: ICD-10-CM

## 2018-07-09 DIAGNOSIS — N40.0 BENIGN PROSTATIC HYPERPLASIA WITHOUT LOWER URINARY TRACT SYMPTOMS: ICD-10-CM

## 2018-07-09 DIAGNOSIS — E78.5 HYPERLIPIDEMIA, UNSPECIFIED HYPERLIPIDEMIA TYPE: ICD-10-CM

## 2018-07-09 DIAGNOSIS — G25.0 BENIGN ESSENTIAL TREMOR: ICD-10-CM

## 2018-07-09 DIAGNOSIS — Z12.5 PROSTATE CANCER SCREENING: ICD-10-CM

## 2018-07-09 DIAGNOSIS — G20 PARKINSONISM, UNSPECIFIED PARKINSONISM TYPE (HCC): ICD-10-CM

## 2018-07-09 PROCEDURE — 4040F PNEUMOC VAC/ADMIN/RCVD: CPT | Performed by: FAMILY MEDICINE

## 2018-07-09 PROCEDURE — G8427 DOCREV CUR MEDS BY ELIG CLIN: HCPCS | Performed by: FAMILY MEDICINE

## 2018-07-09 PROCEDURE — 1123F ACP DISCUSS/DSCN MKR DOCD: CPT | Performed by: FAMILY MEDICINE

## 2018-07-09 PROCEDURE — G8420 CALC BMI NORM PARAMETERS: HCPCS | Performed by: FAMILY MEDICINE

## 2018-07-09 PROCEDURE — 99214 OFFICE O/P EST MOD 30 MIN: CPT | Performed by: FAMILY MEDICINE

## 2018-07-09 PROCEDURE — 1036F TOBACCO NON-USER: CPT | Performed by: FAMILY MEDICINE

## 2018-07-09 NOTE — PROGRESS NOTES
SUBJECTIVE:  80 y.o.. male  for follow up of diabetes, hypertension, and hypercholesterolemia. Diabetic Review of Systems - medication compliance: compliant most of the time, diabetic diet compliance: compliant most of the time, home glucose monitoring: fasting values range - not checking , further diabetic ROS: no polyuria or polydipsia, no chest pain, dyspnea or TIA's, no numbness, tingling or pain in extremities, last eye exam approximately 3 weeks ago. He is only taking 1/2 of Lipitor 20 mg = 10 mg daily . He stopped,but restarted it 1 month ago. He does treadmill 4 d/ week - 40 minutes . He lives at [facility]. He has tingling to hands,but it has improved with exercises and it is not daily. He thinks his position sitting aggravates the pain. He is still golfing 1d/ week and goes to driving range 2-3 days/ week. He did not go on golfing trip this year due to his wife did not want him to go. His wife goes to exercise with him. Current Outpatient Prescriptions   Medication Sig Dispense Refill    atorvastatin (LIPITOR) 20 MG tablet TAKE 1 TABLET DAILY (Patient taking differently: TAKE 1 TABLET DAILY) 90 tablet 1    finasteride (PROSCAR) 5 MG tablet Take 5 mg by mouth daily.  aspirin 81 MG tablet Take 81 mg by mouth daily.  Glucose Blood (BLOOD GLUCOSE TEST STRIPS) STRP Check Blood sugars qd 100 strip 5    Blood Glucose Monitoring Suppl AGUSTINA by Does not apply route. 1 Device 0    Lancets MISC  100 each 5    propranolol (INDERAL LA) 80 MG CR capsule Take 80 mg by mouth 4 times daily.  terazosin (HYTRIN) 2 MG capsule Take 2 mg by mouth nightly.  Multiple Vitamin (MULTI-VITAMIN PO) Take 1 tablet by mouth daily. No current facility-administered medications for this visit. ROS: All other systems were reviewed and were negative . Patient's allergies and medications were reviewed.   Patient's past medical, surgical, social , and family history glucose monitoring emphasized, all medications, side effects and compliance discussed carefully, foot care discussed and Podiatry visits discussed, annual eye examinations at Ophthalmology discussed, glycohemoglobin and other lab monitoring discussed and long term diabetic complications discussed. Goals:   1) Blood pressure < 130/80  2) HGA1C ideal less than 6.5, at least less than 7.0  3) LDL cholesterol < 100  4) Exercise 150 minutes a week   5) Dilated eye exam by an optometrist or ophthalmologist once a year  6) Fasting blood sugar < 110  7) Glucose 2 hours after meal < 140  8) Aspirin 81 mg once a day  9) BMI (Body Mass Index) ideal < 25, at least less than 30. Weight loss of even 10 to 15 pounds is effective in improving diabetes  9) Total fat intake to less than 60 grams per day and saturated fat to less than 20 grams per day. 10) Diabetic education and diabetic nutrition classes. [phone]) Doctor visits every 3 months. Return in about 6 months (around 1/9/2019).

## 2018-07-12 LAB
A/G RATIO: 1.5 (CALC) (ref 1–2.5)
ALBUMIN SERPL-MCNC: 3.8 G/DL (ref 3.6–5.1)
ALP BLD-CCNC: 51 U/L (ref 40–115)
ALT SERPL-CCNC: 13 U/L (ref 9–46)
AST SERPL-CCNC: 17 U/L (ref 10–35)
BILIRUB SERPL-MCNC: 1.1 MG/DL (ref 0.2–1.2)
BUN / CREAT RATIO: ABNORMAL (CALC) (ref 6–22)
BUN BLDV-MCNC: 24 MG/DL (ref 7–25)
CALCIUM SERPL-MCNC: 9.5 MG/DL (ref 8.6–10.3)
CHLORIDE BLD-SCNC: 103 MMOL/L (ref 98–110)
CHOLESTEROL, TOTAL: 147 MG/DL
CHOLESTEROL/HDL RATIO: 2.3 (CALC)
CHOLESTEROL: 83 MG/DL (CALC)
CO2: 31 MMOL/L (ref 20–31)
CREAT SERPL-MCNC: 1.02 MG/DL (ref 0.7–1.11)
GFR AFRICAN AMERICAN: 74 ML/MIN/1.73M2
GFR SERPL CREATININE-BSD FRML MDRD: 64 ML/MIN/1.73M2
GLOBULIN: 2.6 G/DL (CALC) (ref 1.9–3.7)
GLUCOSE BLD-MCNC: 104 MG/DL (ref 65–99)
HBA1C MFR BLD: 5.6 % OF TOTAL HGB
HDLC SERPL-MCNC: 64 MG/DL
LDL CHOLESTEROL CALCULATED: 70 MG/DL (CALC)
POTASSIUM SERPL-SCNC: 4.3 MMOL/L (ref 3.5–5.3)
PROSTATE SPECIFIC ANTIGEN: 1.4 NG/ML
SODIUM BLD-SCNC: 140 MMOL/L (ref 135–146)
TOTAL PROTEIN: 6.4 G/DL (ref 6.1–8.1)
TRIGL SERPL-MCNC: 58 MG/DL

## 2018-07-20 ENCOUNTER — TELEPHONE (OUTPATIENT)
Dept: FAMILY MEDICINE CLINIC | Age: 83
End: 2018-07-20

## 2018-07-20 NOTE — TELEPHONE ENCOUNTER
Pt called in inquiring about his lab results. Pt did not read his my chart message so I read him verbatim Dr. Franko Montanez message. He acknowledges her message and is aware of recheck in 6 months.

## 2019-01-07 ENCOUNTER — OFFICE VISIT (OUTPATIENT)
Dept: FAMILY MEDICINE CLINIC | Age: 84
End: 2019-01-07
Payer: MEDICARE

## 2019-01-07 VITALS
RESPIRATION RATE: 12 BRPM | WEIGHT: 143.8 LBS | OXYGEN SATURATION: 97 % | HEART RATE: 46 BPM | DIASTOLIC BLOOD PRESSURE: 56 MMHG | TEMPERATURE: 98.3 F | SYSTOLIC BLOOD PRESSURE: 120 MMHG | BODY MASS INDEX: 22.52 KG/M2

## 2019-01-07 DIAGNOSIS — Z23 NEEDS FLU SHOT: ICD-10-CM

## 2019-01-07 DIAGNOSIS — E11.9 TYPE 2 DIABETES MELLITUS WITHOUT COMPLICATION, WITHOUT LONG-TERM CURRENT USE OF INSULIN (HCC): Primary | ICD-10-CM

## 2019-01-07 DIAGNOSIS — G25.0 BENIGN ESSENTIAL TREMOR: ICD-10-CM

## 2019-01-07 DIAGNOSIS — N40.0 BENIGN PROSTATIC HYPERPLASIA WITHOUT LOWER URINARY TRACT SYMPTOMS: ICD-10-CM

## 2019-01-07 DIAGNOSIS — M50.30 DDD (DEGENERATIVE DISC DISEASE), CERVICAL: ICD-10-CM

## 2019-01-07 DIAGNOSIS — E78.5 HYPERLIPIDEMIA, UNSPECIFIED HYPERLIPIDEMIA TYPE: ICD-10-CM

## 2019-01-07 PROCEDURE — 1123F ACP DISCUSS/DSCN MKR DOCD: CPT | Performed by: FAMILY MEDICINE

## 2019-01-07 PROCEDURE — 90662 IIV NO PRSV INCREASED AG IM: CPT | Performed by: FAMILY MEDICINE

## 2019-01-07 PROCEDURE — G8420 CALC BMI NORM PARAMETERS: HCPCS | Performed by: FAMILY MEDICINE

## 2019-01-07 PROCEDURE — G0008 ADMIN INFLUENZA VIRUS VAC: HCPCS | Performed by: FAMILY MEDICINE

## 2019-01-07 PROCEDURE — 1036F TOBACCO NON-USER: CPT | Performed by: FAMILY MEDICINE

## 2019-01-07 PROCEDURE — G8510 SCR DEP NEG, NO PLAN REQD: HCPCS | Performed by: FAMILY MEDICINE

## 2019-01-07 PROCEDURE — G8427 DOCREV CUR MEDS BY ELIG CLIN: HCPCS | Performed by: FAMILY MEDICINE

## 2019-01-07 PROCEDURE — 1101F PT FALLS ASSESS-DOCD LE1/YR: CPT | Performed by: FAMILY MEDICINE

## 2019-01-07 PROCEDURE — 4040F PNEUMOC VAC/ADMIN/RCVD: CPT | Performed by: FAMILY MEDICINE

## 2019-01-07 PROCEDURE — G8482 FLU IMMUNIZE ORDER/ADMIN: HCPCS | Performed by: FAMILY MEDICINE

## 2019-01-07 PROCEDURE — 99214 OFFICE O/P EST MOD 30 MIN: CPT | Performed by: FAMILY MEDICINE

## 2019-01-07 ASSESSMENT — PATIENT HEALTH QUESTIONNAIRE - PHQ9
SUM OF ALL RESPONSES TO PHQ QUESTIONS 1-9: 0
SUM OF ALL RESPONSES TO PHQ QUESTIONS 1-9: 0
1. LITTLE INTEREST OR PLEASURE IN DOING THINGS: 0
SUM OF ALL RESPONSES TO PHQ9 QUESTIONS 1 & 2: 0
2. FEELING DOWN, DEPRESSED OR HOPELESS: 0

## 2019-01-09 LAB
A/G RATIO: 1.4 (CALC) (ref 1–2.5)
ALBUMIN SERPL-MCNC: 3.6 G/DL (ref 3.6–5.1)
ALP BLD-CCNC: 53 U/L (ref 40–115)
ALT SERPL-CCNC: 15 U/L (ref 9–46)
AST SERPL-CCNC: 15 U/L (ref 10–35)
BILIRUB SERPL-MCNC: 0.8 MG/DL (ref 0.2–1.2)
BUN / CREAT RATIO: ABNORMAL (CALC) (ref 6–22)
BUN BLDV-MCNC: 22 MG/DL (ref 7–25)
CALCIUM SERPL-MCNC: 9.1 MG/DL (ref 8.6–10.3)
CHLORIDE BLD-SCNC: 106 MMOL/L (ref 98–110)
CHOLESTEROL, TOTAL: 133 MG/DL
CHOLESTEROL/HDL RATIO: 2.3 (CALC)
CHOLESTEROL: 74 MG/DL (CALC)
CO2: 32 MMOL/L (ref 20–32)
CREAT SERPL-MCNC: 0.96 MG/DL (ref 0.7–1.11)
GFR AFRICAN AMERICAN: 79 ML/MIN/1.73M2
GFR SERPL CREATININE-BSD FRML MDRD: 68 ML/MIN/1.73M2
GLOBULIN: 2.5 G/DL (CALC) (ref 1.9–3.7)
GLUCOSE BLD-MCNC: 102 MG/DL (ref 65–99)
HBA1C MFR BLD: 5.8 % OF TOTAL HGB
HDLC SERPL-MCNC: 59 MG/DL
LDL CHOLESTEROL CALCULATED: 61 MG/DL (CALC)
POTASSIUM SERPL-SCNC: 4.4 MMOL/L (ref 3.5–5.3)
SODIUM BLD-SCNC: 142 MMOL/L (ref 135–146)
TOTAL PROTEIN: 6.1 G/DL (ref 6.1–8.1)
TRIGL SERPL-MCNC: 46 MG/DL

## 2019-01-10 DIAGNOSIS — E78.5 HYPERLIPIDEMIA, UNSPECIFIED HYPERLIPIDEMIA TYPE: ICD-10-CM

## 2019-01-10 RX ORDER — ATORVASTATIN CALCIUM 20 MG/1
TABLET, FILM COATED ORAL
Qty: 90 TABLET | Refills: 3 | OUTPATIENT
Start: 2019-01-10

## 2019-06-27 LAB — DIABETIC RETINOPATHY: NEGATIVE

## 2019-07-08 ENCOUNTER — OFFICE VISIT (OUTPATIENT)
Dept: FAMILY MEDICINE CLINIC | Age: 84
End: 2019-07-08
Payer: MEDICARE

## 2019-07-08 VITALS
BODY MASS INDEX: 22.08 KG/M2 | DIASTOLIC BLOOD PRESSURE: 68 MMHG | OXYGEN SATURATION: 98 % | WEIGHT: 141 LBS | HEART RATE: 68 BPM | TEMPERATURE: 97.8 F | RESPIRATION RATE: 18 BRPM | SYSTOLIC BLOOD PRESSURE: 130 MMHG

## 2019-07-08 DIAGNOSIS — E11.9 TYPE 2 DIABETES MELLITUS WITHOUT COMPLICATION, WITHOUT LONG-TERM CURRENT USE OF INSULIN (HCC): Primary | ICD-10-CM

## 2019-07-08 DIAGNOSIS — G20 PARKINSONISM, UNSPECIFIED PARKINSONISM TYPE (HCC): ICD-10-CM

## 2019-07-08 DIAGNOSIS — E78.5 HYPERLIPIDEMIA, UNSPECIFIED HYPERLIPIDEMIA TYPE: ICD-10-CM

## 2019-07-08 DIAGNOSIS — H91.93 HEARING DIFFICULTY, BILATERAL: ICD-10-CM

## 2019-07-08 DIAGNOSIS — N40.0 BENIGN PROSTATIC HYPERPLASIA WITHOUT LOWER URINARY TRACT SYMPTOMS: ICD-10-CM

## 2019-07-08 PROCEDURE — 99214 OFFICE O/P EST MOD 30 MIN: CPT | Performed by: FAMILY MEDICINE

## 2019-07-08 PROCEDURE — 1123F ACP DISCUSS/DSCN MKR DOCD: CPT | Performed by: FAMILY MEDICINE

## 2019-07-08 PROCEDURE — 1036F TOBACCO NON-USER: CPT | Performed by: FAMILY MEDICINE

## 2019-07-08 PROCEDURE — G8420 CALC BMI NORM PARAMETERS: HCPCS | Performed by: FAMILY MEDICINE

## 2019-07-08 PROCEDURE — G8427 DOCREV CUR MEDS BY ELIG CLIN: HCPCS | Performed by: FAMILY MEDICINE

## 2019-07-08 PROCEDURE — 4040F PNEUMOC VAC/ADMIN/RCVD: CPT | Performed by: FAMILY MEDICINE

## 2019-07-08 NOTE — PROGRESS NOTES
hydrated. Neck : no lymphadenopathy, supple, FROM  CV: Regular rate and rhythm , no murmurs/ rub/ gallop. No edema. Lungs : CTA bilaterally, breathing comfortably  Abdomen: positive bowel sounds, soft , non tender, non distended. No hepatosplenomegaly. Feet: normal sensation to monofilament bilaterally, no ulcers. DP/ PT pulses normal.   Skin: no rashes. Non tender. ASSESSMENT:  1. Type 2 diabetes mellitus without complication, without long-term current use of insulin (HCC)  - controlled and continue dietary/ lifestyle modifications. - Comprehensive Metabolic Panel; Future, Hemoglobin A1C; Future  - Microalbumin / Creatinine Urine Ratio    2. Hyperlipidemia, unspecified hyperlipidemia type  - controlled and continue Lipitor 20 mg qd and low cholesterol diet. - Comprehensive Metabolic Panel; Future, Lipid Panel; Future    3. Parkinsonism, unspecified Parkinsonism type ([address]  - stable. 4. Benign prostatic hyperplasia without lower urinary tract symptoms  - stable. Continue Hytrin and Proscar qd.     5. Hearing difficulty, bilateral  - advised to go to [place] for hearing aids. PLAN:  See orders for this visit as documented in the electronic medical record. Issues reviewed with her: low cholesterol diet, weight control and daily exercise discussed, home glucose monitoring emphasized, all medications, side effects and compliance discussed carefully, foot care discussed and Podiatry visits discussed, annual eye examinations at Ophthalmology discussed, glycohemoglobin and other lab monitoring discussed and long term diabetic complications discussed.     Goals:   1) Blood pressure < 130/80  2) HGA1C ideal less than 6.5, at least less than 7.0  3) LDL cholesterol < 100  4) Exercise 150 minutes a week   5) Dilated eye exam by an optometrist or ophthalmologist once a year  6) Fasting blood sugar < 110  7) Glucose 2 hours after meal < 140  8) Aspirin 81 mg once a day  9) BMI (Body Mass Index) ideal < 25, at

## 2019-07-11 LAB
A/G RATIO: 1.5 (CALC) (ref 1–2.5)
ALBUMIN SERPL-MCNC: 3.8 G/DL (ref 3.6–5.1)
ALP BLD-CCNC: 52 U/L (ref 40–115)
ALT SERPL-CCNC: 13 U/L (ref 9–46)
AST SERPL-CCNC: 20 U/L (ref 10–35)
BILIRUB SERPL-MCNC: 0.9 MG/DL (ref 0.2–1.2)
BUN / CREAT RATIO: ABNORMAL (CALC) (ref 6–22)
BUN BLDV-MCNC: 25 MG/DL (ref 7–25)
CALCIUM SERPL-MCNC: 9.2 MG/DL (ref 8.6–10.3)
CHLORIDE BLD-SCNC: 104 MMOL/L (ref 98–110)
CHOLESTEROL, TOTAL: 192 MG/DL
CHOLESTEROL/HDL RATIO: 3.1 (CALC)
CHOLESTEROL: 130 MG/DL (CALC)
CO2: 32 MMOL/L (ref 20–32)
CREAT SERPL-MCNC: 1.05 MG/DL (ref 0.7–1.11)
CREATININE URINE: 83 MG/DL (ref 20–320)
GFR AFRICAN AMERICAN: 71 ML/MIN/1.73M2
GFR, ESTIMATED: 61 ML/MIN/1.73M2
GLOBULIN: 2.6 G/DL (CALC) (ref 1.9–3.7)
GLUCOSE BLD-MCNC: 115 MG/DL (ref 65–99)
HBA1C MFR BLD: 5.6 % OF TOTAL HGB
HDLC SERPL-MCNC: 62 MG/DL
LDL CHOLESTEROL CALCULATED: 115 MG/DL (CALC)
MICROALBUMIN UR-MCNC: 0.4 MG/DL
MICROALBUMIN/CREAT UR-RTO: 5 MCG/MG CREAT
POTASSIUM SERPL-SCNC: 4.2 MMOL/L (ref 3.5–5.3)
SODIUM BLD-SCNC: 140 MMOL/L (ref 135–146)
TOTAL PROTEIN: 6.4 G/DL (ref 6.1–8.1)
TRIGL SERPL-MCNC: 56 MG/DL

## 2020-01-02 ENCOUNTER — TELEPHONE (OUTPATIENT)
Dept: FAMILY MEDICINE CLINIC | Age: 85
End: 2020-01-02

## 2020-01-08 ENCOUNTER — OFFICE VISIT (OUTPATIENT)
Dept: FAMILY MEDICINE CLINIC | Age: 85
End: 2020-01-08
Payer: MEDICARE

## 2020-01-08 VITALS
OXYGEN SATURATION: 96 % | TEMPERATURE: 95.2 F | RESPIRATION RATE: 14 BRPM | DIASTOLIC BLOOD PRESSURE: 49 MMHG | HEART RATE: 52 BPM | BODY MASS INDEX: 22.29 KG/M2 | SYSTOLIC BLOOD PRESSURE: 114 MMHG | WEIGHT: 142 LBS | HEIGHT: 67 IN

## 2020-01-08 PROCEDURE — 1036F TOBACCO NON-USER: CPT | Performed by: FAMILY MEDICINE

## 2020-01-08 PROCEDURE — G8484 FLU IMMUNIZE NO ADMIN: HCPCS | Performed by: FAMILY MEDICINE

## 2020-01-08 PROCEDURE — G8427 DOCREV CUR MEDS BY ELIG CLIN: HCPCS | Performed by: FAMILY MEDICINE

## 2020-01-08 PROCEDURE — 4040F PNEUMOC VAC/ADMIN/RCVD: CPT | Performed by: FAMILY MEDICINE

## 2020-01-08 PROCEDURE — G8420 CALC BMI NORM PARAMETERS: HCPCS | Performed by: FAMILY MEDICINE

## 2020-01-08 PROCEDURE — 1123F ACP DISCUSS/DSCN MKR DOCD: CPT | Performed by: FAMILY MEDICINE

## 2020-01-08 PROCEDURE — 99214 OFFICE O/P EST MOD 30 MIN: CPT | Performed by: FAMILY MEDICINE

## 2020-01-08 ASSESSMENT — PATIENT HEALTH QUESTIONNAIRE - PHQ9
SUM OF ALL RESPONSES TO PHQ9 QUESTIONS 1 & 2: 0
SUM OF ALL RESPONSES TO PHQ QUESTIONS 1-9: 0
SUM OF ALL RESPONSES TO PHQ QUESTIONS 1-9: 0
1. LITTLE INTEREST OR PLEASURE IN DOING THINGS: 0
2. FEELING DOWN, DEPRESSED OR HOPELESS: 0

## 2020-01-08 NOTE — PROGRESS NOTES
SUBJECTIVE:  80 y.o.. male  for follow up of diabetes, hypertension, and hypercholesterolemia. Diabetic Review of Systems - medication compliance: compliant all of the time, compliant most of the time, noncompliant some of the time, diabetic diet compliance: compliant all of the time, home glucose monitoring: is performed sporadically, further diabetic ROS: no polyuria or polydipsia, no chest pain, dyspnea or TIA's, no numbness, tingling or pain in extremities, last eye exam approximately 7/19. His hand tremor flares at times, but able to golf regularly . He plays golf 1d/ week. He is still walking on treadmill for 40 minutes 4d/ week. He takes Propranolol 40 mg qid. He has seen neurologist and sees him 1x/ year now, but other things tried have not helped. No dizziness or fatigue. Movement flares the tremor, including eating. He is doing [employer] in March or April. His daughter is going with him . His son is accompanying a friend. Current Outpatient Medications   Medication Sig Dispense Refill    atorvastatin (LIPITOR) 20 MG tablet TAKE 1 TABLET DAILY (Patient taking differently: TAKE 1 TABLET DAILY) 90 tablet 1    finasteride (PROSCAR) 5 MG tablet Take 5 mg by mouth daily.  aspirin 81 MG tablet Take 81 mg by mouth daily.  Glucose Blood (BLOOD GLUCOSE TEST STRIPS) STRP Check Blood sugars qd 100 strip 5    Blood Glucose Monitoring Suppl AGUSTINA by Does not apply route. 1 Device 0    Lancets MISC  100 each 5    propranolol (INDERAL LA) 80 MG CR capsule Take 80 mg by mouth 4 times daily.  terazosin (HYTRIN) 2 MG capsule Take 2 mg by mouth nightly.  Multiple Vitamin (MULTI-VITAMIN PO) Take 1 tablet by mouth daily. No current facility-administered medications for this visit. ROS: All other systems were reviewed and were negative . Patient's allergies and medications were reviewed.   Patient's past medical, surgical, social , and family history were reviewed. OBJECTIVE:  BP (!) 114/49   Pulse 52   Temp 95.2 °F (35.1 °C) (Oral)   Resp 14   Ht 5' 7\" (1.702 m)   Wt 142 lb (64.4 kg)   SpO2 96%   BMI 22.24 kg/m²   General: NAD, cooperative, alert and oriented X 3, affect / mood is good. Good insight. well hydrated. Neck : no lymphadenopathy, supple, FROM  CV: Regular rate and rhythm , no murmurs/ rub/ gallop. No edema. Lungs : CTA bilaterally, breathing comfortably  Abdomen: positive bowel sounds, soft , non tender, non distended. No hepatosplenomegaly. Feet: normal sensation to monofilament bilaterally, no ulcers. DP/ PT pulses normal.   Skin: no rashes. Non tender. ASSESSMENT:  1. Type 2 diabetes mellitus without complication, without long-term current use of insulin (HCC)  - Controlled. Continue dietary/ lifestyle modifications, including decreased concentrated sweets and carbohydrates. - Comprehensive Metabolic Panel; Hemoglobin A1C; Future  -  DIABETES FOOT EXAM    2. Hyperlipidemia, unspecified hyperlipidemia type  - continue low cholesterol diet. - Comprehensive Metabolic Panel; Lipid Panel; Future    3. Benign essential tremor  - stable. Continue Propanolol LA 80 mg tid, but hold pm dose and monitor .   - Comprehensive Metabolic ; Future    4. Benign prostatic hyperplasia without lower urinary tract symptoms  - followed by Urologist.     PLAN:  See orders for this visit as documented in the electronic medical record. Issues reviewed with her: low cholesterol diet, weight control and daily exercise discussed, home glucose monitoring emphasized, all medications, side effects and compliance discussed carefully, foot care discussed and Podiatry visits discussed, annual eye examinations at Ophthalmology discussed, glycohemoglobin and other lab monitoring discussed and long term diabetic complications discussed.     Goals:   1) Blood pressure < 130/80  2) HGA1C ideal less than 6.5, at least less than 7.0  3) LDL cholesterol < 100  4) Exercise 150 minutes a week   5) Dilated eye exam by an optometrist or ophthalmologist once a year  6) Fasting blood sugar < 110  7) Glucose 2 hours after meal < 140  8) Aspirin 81 mg once a day  9) BMI (Body Mass Index) ideal < 25, at least less than 30. Weight loss of even 10 to 15 pounds is effective in improving diabetes  9) Total fat intake to less than 60 grams per day and saturated fat to less than 20 grams per day. 10) Diabetic education and diabetic nutrition classes. [phone]) Doctor visits every 3 months. Follow up 6 months/ prn.

## 2020-01-14 LAB
A/G RATIO: 1.4 (CALC) (ref 1–2.5)
ALBUMIN SERPL-MCNC: 3.8 G/DL (ref 3.6–5.1)
ALP BLD-CCNC: 54 U/L (ref 40–115)
ALT SERPL-CCNC: 16 U/L (ref 9–46)
AST SERPL-CCNC: 16 U/L (ref 10–35)
BILIRUB SERPL-MCNC: 0.9 MG/DL (ref 0.2–1.2)
BUN / CREAT RATIO: ABNORMAL (CALC) (ref 6–22)
BUN BLDV-MCNC: 18 MG/DL (ref 7–25)
CALCIUM SERPL-MCNC: 9.3 MG/DL (ref 8.6–10.3)
CHLORIDE BLD-SCNC: 104 MMOL/L (ref 98–110)
CHOLESTEROL, TOTAL: 183 MG/DL
CHOLESTEROL/HDL RATIO: 3 (CALC)
CHOLESTEROL: 121 MG/DL (CALC)
CO2: 32 MMOL/L (ref 20–32)
CREAT SERPL-MCNC: 0.99 MG/DL (ref 0.7–1.11)
GFR AFRICAN AMERICAN: 75 ML/MIN/1.73M2
GFR, ESTIMATED: 65 ML/MIN/1.73M2
GLOBULIN: 2.8 G/DL (CALC) (ref 1.9–3.7)
GLUCOSE BLD-MCNC: 119 MG/DL (ref 65–99)
HBA1C MFR BLD: 5.8 % OF TOTAL HGB
HDLC SERPL-MCNC: 62 MG/DL
LDL CHOLESTEROL CALCULATED: 106 MG/DL (CALC)
POTASSIUM SERPL-SCNC: 4.6 MMOL/L (ref 3.5–5.3)
SODIUM BLD-SCNC: 142 MMOL/L (ref 135–146)
TOTAL PROTEIN: 6.6 G/DL (ref 6.1–8.1)
TRIGL SERPL-MCNC: 64 MG/DL

## 2020-07-06 ENCOUNTER — OFFICE VISIT (OUTPATIENT)
Dept: FAMILY MEDICINE CLINIC | Age: 85
End: 2020-07-06
Payer: MEDICARE

## 2020-07-06 VITALS
DIASTOLIC BLOOD PRESSURE: 54 MMHG | TEMPERATURE: 98 F | SYSTOLIC BLOOD PRESSURE: 115 MMHG | WEIGHT: 148.4 LBS | BODY MASS INDEX: 23.24 KG/M2 | OXYGEN SATURATION: 96 % | HEART RATE: 58 BPM

## 2020-07-06 PROCEDURE — 1036F TOBACCO NON-USER: CPT | Performed by: FAMILY MEDICINE

## 2020-07-06 PROCEDURE — 4040F PNEUMOC VAC/ADMIN/RCVD: CPT | Performed by: FAMILY MEDICINE

## 2020-07-06 PROCEDURE — 1123F ACP DISCUSS/DSCN MKR DOCD: CPT | Performed by: FAMILY MEDICINE

## 2020-07-06 PROCEDURE — G8420 CALC BMI NORM PARAMETERS: HCPCS | Performed by: FAMILY MEDICINE

## 2020-07-06 PROCEDURE — 99214 OFFICE O/P EST MOD 30 MIN: CPT | Performed by: FAMILY MEDICINE

## 2020-07-06 PROCEDURE — G8427 DOCREV CUR MEDS BY ELIG CLIN: HCPCS | Performed by: FAMILY MEDICINE

## 2020-07-06 NOTE — PROGRESS NOTES
SUBJECTIVE:  80 y.o.. male  for follow up of diabetes, hypertension, and hypercholesterolemia. Diabetic Review of Systems - medication compliance: compliant most of the time, diabetic diet compliance: compliant most of the time, home glucose monitoring: fasting values range - not checking, further diabetic ROS: no polyuria or polydipsia, no chest pain, dyspnea or TIA's, no numbness, tingling or pain in extremities, last eye exam approximately 7/20. Current Outpatient Medications   Medication Sig Dispense Refill    finasteride (PROSCAR) 5 MG tablet Take 5 mg by mouth daily.  Glucose Blood (BLOOD GLUCOSE TEST STRIPS) STRP Check Blood sugars qd 100 strip 5    Lancets MISC  100 each 5    propranolol (INDERAL LA) 80 MG CR capsule Take 80 mg by mouth 4 times daily.  terazosin (HYTRIN) 2 MG capsule Take 2 mg by mouth nightly.  Multiple Vitamin (MULTI-VITAMIN PO) Take 1 tablet by mouth daily.  Blood Glucose Monitoring Suppl AGUSTINA by Does not apply route. 1 Device 0     No current facility-administered medications for this visit. ROS: All other systems were reviewed and were negative . Patient's allergies and medications were reviewed. Patient's past medical, surgical, social , and family history were reviewed. OBJECTIVE:  BP (!) 115/54   Pulse 58   Temp 98 °F (36.7 °C)   Wt 148 lb 6.4 oz (67.3 kg)   SpO2 96%   BMI 23.24 kg/m²   General: NAD, cooperative, alert and oriented X 3, affect / mood is good. Good insight. well hydrated. Neck : no lymphadenopathy, supple, FROM  CV: Regular rate and rhythm , no murmurs/ rub/ gallop. No edema. Lungs : CTA bilaterally, breathing comfortably  Abdomen: positive bowel sounds, soft , non tender, non distended. No hepatosplenomegaly. Feet: normal sensation to monofilament bilaterally, no ulcers. DP/ PT pulses normal.   Skin: no rashes. Non tender. ASSESSMENT:  1.  Type 2 diabetes mellitus without complication, without long-term

## 2020-07-09 LAB
A/G RATIO: 1.4 (CALC) (ref 1–2.5)
ALBUMIN SERPL-MCNC: 3.9 G/DL (ref 3.6–5.1)
ALP BLD-CCNC: 60 U/L (ref 35–144)
ALT SERPL-CCNC: 16 U/L (ref 9–46)
AST SERPL-CCNC: 17 U/L (ref 10–35)
BILIRUB SERPL-MCNC: 0.9 MG/DL (ref 0.2–1.2)
BUN / CREAT RATIO: ABNORMAL (CALC) (ref 6–22)
BUN BLDV-MCNC: 24 MG/DL (ref 7–25)
CALCIUM SERPL-MCNC: 9.4 MG/DL (ref 8.6–10.3)
CHLORIDE BLD-SCNC: 102 MMOL/L (ref 98–110)
CHOLESTEROL, TOTAL: 190 MG/DL
CHOLESTEROL/HDL RATIO: 3.1 (CALC)
CO2: 32 MMOL/L (ref 20–32)
CREAT SERPL-MCNC: 1.05 MG/DL (ref 0.7–1.11)
GFR AFRICAN AMERICAN: 70 ML/MIN/1.73M2
GFR, ESTIMATED: 60 ML/MIN/1.73M2
GLOBULIN: 2.7 G/DL (CALC) (ref 1.9–3.7)
GLUCOSE BLD-MCNC: 131 MG/DL (ref 65–99)
HBA1C MFR BLD: 5.8 % OF TOTAL HGB
HDLC SERPL-MCNC: 61 MG/DL
LDL CHOLESTEROL CALCULATED: 114 MG/DL (CALC)
NONHDLC SERPL-MCNC: 129 MG/DL (CALC)
POTASSIUM SERPL-SCNC: 4.6 MMOL/L (ref 3.5–5.3)
SODIUM BLD-SCNC: 139 MMOL/L (ref 135–146)
TOTAL PROTEIN: 6.6 G/DL (ref 6.1–8.1)
TRIGL SERPL-MCNC: 67 MG/DL

## 2022-01-10 ENCOUNTER — OFFICE VISIT (OUTPATIENT)
Dept: FAMILY MEDICINE CLINIC | Age: 87
End: 2022-01-10
Payer: MEDICARE

## 2022-01-10 VITALS
SYSTOLIC BLOOD PRESSURE: 130 MMHG | WEIGHT: 148.8 LBS | OXYGEN SATURATION: 99 % | RESPIRATION RATE: 14 BRPM | TEMPERATURE: 97.1 F | BODY MASS INDEX: 23.31 KG/M2 | DIASTOLIC BLOOD PRESSURE: 60 MMHG | HEART RATE: 55 BPM

## 2022-01-10 DIAGNOSIS — L98.491 ULCER OF EXTERNAL EAR, LIMITED TO BREAKDOWN OF SKIN (HCC): ICD-10-CM

## 2022-01-10 DIAGNOSIS — E11.9 TYPE 2 DIABETES MELLITUS WITHOUT COMPLICATION, WITHOUT LONG-TERM CURRENT USE OF INSULIN (HCC): Primary | ICD-10-CM

## 2022-01-10 DIAGNOSIS — R20.2 NUMBNESS AND TINGLING IN BOTH HANDS: ICD-10-CM

## 2022-01-10 DIAGNOSIS — E78.5 HYPERLIPIDEMIA, UNSPECIFIED HYPERLIPIDEMIA TYPE: ICD-10-CM

## 2022-01-10 DIAGNOSIS — H60.502 ACUTE OTITIS EXTERNA OF LEFT EAR, UNSPECIFIED TYPE: ICD-10-CM

## 2022-01-10 DIAGNOSIS — G20 PARKINSONISM, UNSPECIFIED PARKINSONISM TYPE (HCC): ICD-10-CM

## 2022-01-10 DIAGNOSIS — B07.9 FACIAL WART: ICD-10-CM

## 2022-01-10 DIAGNOSIS — M50.30 DDD (DEGENERATIVE DISC DISEASE), CERVICAL: ICD-10-CM

## 2022-01-10 DIAGNOSIS — R20.0 NUMBNESS AND TINGLING IN BOTH HANDS: ICD-10-CM

## 2022-01-10 DIAGNOSIS — N40.0 BENIGN PROSTATIC HYPERPLASIA WITHOUT LOWER URINARY TRACT SYMPTOMS: ICD-10-CM

## 2022-01-10 PROCEDURE — 99214 OFFICE O/P EST MOD 30 MIN: CPT | Performed by: FAMILY MEDICINE

## 2022-01-10 PROCEDURE — 4040F PNEUMOC VAC/ADMIN/RCVD: CPT | Performed by: FAMILY MEDICINE

## 2022-01-10 PROCEDURE — G8420 CALC BMI NORM PARAMETERS: HCPCS | Performed by: FAMILY MEDICINE

## 2022-01-10 PROCEDURE — 1036F TOBACCO NON-USER: CPT | Performed by: FAMILY MEDICINE

## 2022-01-10 PROCEDURE — G8427 DOCREV CUR MEDS BY ELIG CLIN: HCPCS | Performed by: FAMILY MEDICINE

## 2022-01-10 PROCEDURE — 17111 DESTRUCTION B9 LESIONS 15/>: CPT | Performed by: FAMILY MEDICINE

## 2022-01-10 PROCEDURE — G8484 FLU IMMUNIZE NO ADMIN: HCPCS | Performed by: FAMILY MEDICINE

## 2022-01-10 PROCEDURE — 1123F ACP DISCUSS/DSCN MKR DOCD: CPT | Performed by: FAMILY MEDICINE

## 2022-01-10 PROCEDURE — 4130F TOPICAL PREP RX AOE: CPT | Performed by: FAMILY MEDICINE

## 2022-01-10 SDOH — ECONOMIC STABILITY: FOOD INSECURITY: WITHIN THE PAST 12 MONTHS, YOU WORRIED THAT YOUR FOOD WOULD RUN OUT BEFORE YOU GOT MONEY TO BUY MORE.: NEVER TRUE

## 2022-01-10 SDOH — ECONOMIC STABILITY: FOOD INSECURITY: WITHIN THE PAST 12 MONTHS, THE FOOD YOU BOUGHT JUST DIDN'T LAST AND YOU DIDN'T HAVE MONEY TO GET MORE.: NEVER TRUE

## 2022-01-10 ASSESSMENT — PATIENT HEALTH QUESTIONNAIRE - PHQ9
SUM OF ALL RESPONSES TO PHQ QUESTIONS 1-9: 0
SUM OF ALL RESPONSES TO PHQ9 QUESTIONS 1 & 2: 0
SUM OF ALL RESPONSES TO PHQ QUESTIONS 1-9: 0
2. FEELING DOWN, DEPRESSED OR HOPELESS: 0
SUM OF ALL RESPONSES TO PHQ QUESTIONS 1-9: 0
SUM OF ALL RESPONSES TO PHQ QUESTIONS 1-9: 0
1. LITTLE INTEREST OR PLEASURE IN DOING THINGS: 0

## 2022-01-10 ASSESSMENT — SOCIAL DETERMINANTS OF HEALTH (SDOH): HOW HARD IS IT FOR YOU TO PAY FOR THE VERY BASICS LIKE FOOD, HOUSING, MEDICAL CARE, AND HEATING?: NOT HARD AT ALL

## 2022-01-10 NOTE — PROGRESS NOTES
SUBJECTIVE:  80 y.o.. male  for follow up of diabetes, hypertension, and hypercholesterolemia. Diabetic Review of Systems - medication compliance: compliant most of the time, diabetic diet compliance: noncompliant some of the time, home glucose monitoring: is not performed, further diabetic ROS: no polyuria or polydipsia, no chest pain, dyspnea or TIA's, no numbness, tingling or pain in extremities, last eye exam approximately Fall 2021. Energy is good. Does treadmill 5d/ week at [facility]. He is still golfing year round, weather permitting. Going to golf simulator tomorrow. Left ear pain X 10 days . Having a hard time placing left hearing aide due to pain He last saw audiologist 1 month ago at [facility] , but was not having problems then. (new hearing aides in 11/21). He complains of pain to top of ear with wart bothering him. Has tingling// numbness to fingers bilateral intermittently while sleeping or when he first gets up in the am. It does not awaken him, but will notice it sometimes if he awakens for another reason. No tingling in the past 1 week, including today . No neck pain. No weakness to arms/ hands. Current Outpatient Medications   Medication Sig Dispense Refill    finasteride (PROSCAR) 5 MG tablet Take 5 mg by mouth daily.  Glucose Blood (BLOOD GLUCOSE TEST STRIPS) STRP Check Blood sugars qd 100 strip 5    Lancets MISC  100 each 5    propranolol (INDERAL LA) 80 MG CR capsule Take 80 mg by mouth 4 times daily.  terazosin (HYTRIN) 2 MG capsule Take 2 mg by mouth nightly.  Multiple Vitamin (MULTI-VITAMIN PO) Take 1 tablet by mouth daily.  Blood Glucose Monitoring Suppl AGUSTINA by Does not apply route. 1 Device 0     No current facility-administered medications for this visit. ROS: All other systems were reviewed and were negative . Patient's allergies and medications were reviewed.   Patient's past medical, surgical, social , and family history were reviewed. OBJECTIVE:  /60   Pulse 55   Temp 97.1 °F (36.2 °C)   Resp 14   Wt 148 lb 12.8 oz (67.5 kg)   SpO2 99%   BMI 23.31 kg/m²   General: NAD, cooperative, alert and oriented X 3, affect / mood is good. Good insight. well hydrated. HEENT: PERRLA, EOMI, TMs - clear. EAC with mild tenderness and erythema on left. Ulceration/ scabbing to internal pinna- likely due to hearing aides. 2 mm lesion to superior pinna. Nasopharynx clear. Neck : no lymphadenopathy, supple, FROM  CV: Regular rate and rhythm , no murmurs/ rub/ gallop. No edema. Lungs : CTA bilaterally, breathing comfortably  Abdomen: positive bowel sounds, soft , non tender, non distended. No hepatosplenomegaly. Feet: normal sensation to monofilament bilaterally, no ulcers. DP/ PT pulses normal.   Skin: no rashes. Non tender. ASSESSMENT:  1. Type 2 diabetes mellitus without complication, without long-term current use of insulin ([facility])  - HgbA1c= 5.8 in 7/21. Controlled. - Continue dietary/ lifestyle modifications, including decreased concentrated sweets and carbohydrates. - Comprehensive Metabolic Panel; Future  - Hemoglobin A1C; Future  - Microalbumin / Creatinine Urine Ratio; Future  -  DIABETES FOOT EXAM    2. Parkinsonism, unspecified Parkinsonism type ([address]  - Stable. Continue Inderal qd.     3. Acute otitis externa of left ear, unspecified type  - Avoid wearing Hearing aide on left for 1-3 weeks. - neomycin-polymyxin-hydrocortisone (CORTISPORIN) 3.5-60800-7 otic solution; Place 4 drops into the left ear 3 times daily for 7 days Instill into left Ear  Dispense: 10 mL; Refill: 0    4. Facial wart  - Liquid nitrogen was applied for 30 seconds X 3 to the skin lesions and the expected blistering or scabbing reaction explained. Do not pick at the areas. Patient reminded to expect hypopigmented scars from the procedure. Return if lesions fail to fully resolve.   - 85078 - DESTRUCTION BENIGN LESIONS 15 OR MORE    5. Hyperlipidemia, unspecified hyperlipidemia type  - Controlled. Continue Lipitor 20 mg qd and low cholesterol diet. - Comprehensive Metabolic Panel; Future  - Lipid Panel; Future    6. Ulcer of external ear, limited to breakdown of skin ([address]  -  Avoid wearing hearing aide on left for 1-2 weeks to allow to heal. No evidence of infection.    - Recommended follow up with audiologist for different custom mold for ear placement if not improving or recurs. 7. DDD (degenerative disc disease), cervical  - Moist heat . ROM exercises. Modify activities. - Stable. 8. Benign prostatic hyperplasia without lower urinary tract symptoms  - PSA, Prostatic Specific Antigen; Future    9. Numbness and tingling in both hands  - Likely carpal tunnel syndrome given intermittent nature and more at night. - consider use of Cock up wrist splints. PLAN:  See orders for this visit as documented in the electronic medical record. Issues reviewed with her: low cholesterol diet, weight control and daily exercise discussed, home glucose monitoring emphasized, all medications, side effects and compliance discussed carefully, foot care discussed and Podiatry visits discussed, annual eye examinations at Ophthalmology discussed, glycohemoglobin and other lab monitoring discussed and long term diabetic complications discussed. Goals:   1) Blood pressure < 130/80  2) HGA1C ideal less than 6.5, at least less than 7.0  3) LDL cholesterol < 100  4) Exercise 150 minutes a week   5) Dilated eye exam by an optometrist or ophthalmologist once a year  6) Fasting blood sugar < 110  7) Glucose 2 hours after meal < 140  8) Aspirin 81 mg once a day  9) BMI (Body Mass Index) ideal < 25, at least less than 30. Weight loss of even 10 to 15 pounds is effective in improving diabetes  9) Total fat intake to less than 60 grams per day and saturated fat to less than 20 grams per day.     10) Diabetic education and diabetic nutrition

## 2022-01-13 LAB
A/G RATIO: 1.5 (CALC) (ref 1–2.5)
ALBUMIN SERPL-MCNC: 3.9 G/DL (ref 3.6–5.1)
ALP BLD-CCNC: 56 U/L (ref 35–144)
ALT SERPL-CCNC: 13 U/L (ref 9–46)
AST SERPL-CCNC: 19 U/L (ref 10–35)
BILIRUB SERPL-MCNC: 1 MG/DL (ref 0.2–1.2)
BUN / CREAT RATIO: ABNORMAL (CALC) (ref 6–22)
BUN BLDV-MCNC: 22 MG/DL (ref 7–25)
CALCIUM SERPL-MCNC: 9.5 MG/DL (ref 8.6–10.3)
CHLORIDE BLD-SCNC: 103 MMOL/L (ref 98–110)
CHOLESTEROL, TOTAL: 191 MG/DL
CHOLESTEROL/HDL RATIO: 3.5 (CALC)
CO2: 31 MMOL/L (ref 20–32)
CREAT SERPL-MCNC: 1.05 MG/DL (ref 0.7–1.11)
CREATININE URINE: 69 MG/DL (ref 20–320)
GFR AFRICAN AMERICAN: 69 ML/MIN/1.73M2
GFR NON-AFRICAN AMERICAN: 60 ML/MIN/1.73M2
GLOBULIN: 2.6 G/DL (CALC) (ref 1.9–3.7)
GLUCOSE BLD-MCNC: 114 MG/DL (ref 65–99)
HBA1C MFR BLD: 5.7 % OF TOTAL HGB
HDLC SERPL-MCNC: 54 MG/DL
LDL CHOLESTEROL CALCULATED: 120 MG/DL (CALC)
MICROALBUMIN UR-MCNC: 0.4 MG/DL
MICROALBUMIN/CREAT UR-RTO: 6 MCG/MG CREAT
NONHDLC SERPL-MCNC: 137 MG/DL (CALC)
POTASSIUM SERPL-SCNC: 4.4 MMOL/L (ref 3.5–5.3)
SODIUM BLD-SCNC: 141 MMOL/L (ref 135–146)
TOTAL PROTEIN: 6.5 G/DL (ref 6.1–8.1)
TRIGL SERPL-MCNC: 78 MG/DL

## 2022-05-04 ENCOUNTER — OFFICE VISIT (OUTPATIENT)
Dept: FAMILY MEDICINE CLINIC | Age: 87
End: 2022-05-04
Payer: MEDICARE

## 2022-05-04 VITALS
HEIGHT: 67 IN | DIASTOLIC BLOOD PRESSURE: 70 MMHG | RESPIRATION RATE: 20 BRPM | SYSTOLIC BLOOD PRESSURE: 138 MMHG | WEIGHT: 152.4 LBS | OXYGEN SATURATION: 98 % | HEART RATE: 55 BPM | BODY MASS INDEX: 23.92 KG/M2 | TEMPERATURE: 97.1 F

## 2022-05-04 DIAGNOSIS — E11.9 TYPE 2 DIABETES MELLITUS WITHOUT COMPLICATION, WITHOUT LONG-TERM CURRENT USE OF INSULIN (HCC): ICD-10-CM

## 2022-05-04 DIAGNOSIS — M50.30 DDD (DEGENERATIVE DISC DISEASE), CERVICAL: ICD-10-CM

## 2022-05-04 DIAGNOSIS — G20 PARKINSONISM, UNSPECIFIED PARKINSONISM TYPE (HCC): ICD-10-CM

## 2022-05-04 DIAGNOSIS — G25.0 BENIGN ESSENTIAL TREMOR: ICD-10-CM

## 2022-05-04 DIAGNOSIS — E78.5 HYPERLIPIDEMIA, UNSPECIFIED HYPERLIPIDEMIA TYPE: ICD-10-CM

## 2022-05-04 DIAGNOSIS — R20.0 NUMBNESS AND TINGLING IN BOTH HANDS: ICD-10-CM

## 2022-05-04 DIAGNOSIS — R20.2 NUMBNESS AND TINGLING IN BOTH HANDS: ICD-10-CM

## 2022-05-04 DIAGNOSIS — Z00.00 MEDICARE ANNUAL WELLNESS VISIT, SUBSEQUENT: ICD-10-CM

## 2022-05-04 DIAGNOSIS — M62.9 HAMSTRING TIGHTNESS OF BOTH LOWER EXTREMITIES: ICD-10-CM

## 2022-05-04 DIAGNOSIS — Z00.00 ROUTINE GENERAL MEDICAL EXAMINATION AT A HEALTH CARE FACILITY: Primary | ICD-10-CM

## 2022-05-04 PROCEDURE — 99213 OFFICE O/P EST LOW 20 MIN: CPT | Performed by: FAMILY MEDICINE

## 2022-05-04 PROCEDURE — G8420 CALC BMI NORM PARAMETERS: HCPCS | Performed by: FAMILY MEDICINE

## 2022-05-04 PROCEDURE — G8427 DOCREV CUR MEDS BY ELIG CLIN: HCPCS | Performed by: FAMILY MEDICINE

## 2022-05-04 PROCEDURE — 1123F ACP DISCUSS/DSCN MKR DOCD: CPT | Performed by: FAMILY MEDICINE

## 2022-05-04 PROCEDURE — 4040F PNEUMOC VAC/ADMIN/RCVD: CPT | Performed by: FAMILY MEDICINE

## 2022-05-04 PROCEDURE — 1036F TOBACCO NON-USER: CPT | Performed by: FAMILY MEDICINE

## 2022-05-04 PROCEDURE — G0439 PPPS, SUBSEQ VISIT: HCPCS | Performed by: FAMILY MEDICINE

## 2022-05-04 PROCEDURE — 3044F HG A1C LEVEL LT 7.0%: CPT | Performed by: FAMILY MEDICINE

## 2022-05-04 ASSESSMENT — PATIENT HEALTH QUESTIONNAIRE - PHQ9
SUM OF ALL RESPONSES TO PHQ QUESTIONS 1-9: 0
SUM OF ALL RESPONSES TO PHQ9 QUESTIONS 1 & 2: 0
SUM OF ALL RESPONSES TO PHQ QUESTIONS 1-9: 0
2. FEELING DOWN, DEPRESSED OR HOPELESS: 0
1. LITTLE INTEREST OR PLEASURE IN DOING THINGS: 0

## 2022-05-04 ASSESSMENT — LIFESTYLE VARIABLES
HOW MANY STANDARD DRINKS CONTAINING ALCOHOL DO YOU HAVE ON A TYPICAL DAY: 1 OR 2
HOW OFTEN DO YOU HAVE A DRINK CONTAINING ALCOHOL: MONTHLY OR LESS

## 2022-05-04 NOTE — PROGRESS NOTES
P  SUBJECTIVE:  80 y.o.. male  for follow up of diabetes, hypertension, and hypercholesterolemia. Diabetic Review of Systems - medication compliance: compliant most of the time, diabetic diet compliance: noncompliant some of the time, home glucose monitoring: fasting values range - not checking , further diabetic ROS: no polyuria or polydipsia, no chest pain, dyspnea or TIA's, no numbness, tingling or pain in extremities, last eye exam approximately Fall 2021. Some numbness / tingling to hands - left > Right . Thumb , index and third . It does not awaken him. He notices it mostly when he gets up and resolves within 15 minutes or so. Occurs 2-3 days per week. It does not bother him during the day . Some fatigue to legs to back of hips bilateral.  Does not seem to bother him during the day . No swelling or tenderness. Does treadmill 3d/ week with elevation, which he has tried to decrease 40 minutes 3-4 d/ week. It does not bother him during exercise - golfing or treadmill. Current Outpatient Medications   Medication Sig Dispense Refill    finasteride (PROSCAR) 5 MG tablet Take 5 mg by mouth daily.  Glucose Blood (BLOOD GLUCOSE TEST STRIPS) STRP Check Blood sugars qd 100 strip 5    Lancets MISC  100 each 5    propranolol (INDERAL LA) 80 MG CR capsule Take 80 mg by mouth 4 times daily.  terazosin (HYTRIN) 2 MG capsule Take 2 mg by mouth nightly.  Multiple Vitamin (MULTI-VITAMIN PO) Take 1 tablet by mouth daily.  Blood Glucose Monitoring Suppl AGUSTINA by Does not apply route. 1 Device 0     No current facility-administered medications for this visit. ROS: All other systems were reviewed and were negative . Patient's allergies and medications were reviewed. Patient's past medical, surgical, social , and family history were reviewed.     OBJECTIVE:  /70   Pulse 55   Temp 97.1 °F (36.2 °C)   Resp 20   Ht 5' 7\" (1.702 m)   Wt 152 lb 6.4 oz (69.1 kg)   SpO2 98% BMI 23.87 kg/m²   General: NAD, cooperative, alert and oriented X 3, affect / mood is good. Good insight. well hydrated. Neck : no lymphadenopathy, supple, FROM  Upper extremities : DTRs 2+ biceps/ triceps/ brachioradialis bilateral.  FROM. Strength 5/5. Positive Tinel's and Phalen's bilateral.   CV: Regular rate and rhythm , no murmurs/ rub/ gallop. No edema. Lungs : CTA bilaterally, breathing comfortably  Abdomen: positive bowel sounds, soft , non tender, non distended. No hepatosplenomegaly. Lower extremities : DTRs 2+ knees and ankles bilateral.  FROM, except tight hamstrings bilateral. Strength 5/5. Negative straight leg-raise. No edema or erythema bilateral.   Feet: normal sensation to monofilament bilaterally, no ulcers. DP/ PT pulses normal.   Skin: no rashes. Non tender. ASSESSMENT:  1. Routine general medical examination at a health care facility  - Reviewed prior labs with repeat due in 6/22. 2. Numbness and tingling in both hands  - discussed ergonomics and wearing wrist splint at night to avoid flares. - Wrist Cock-up    3. Type 2 diabetes mellitus without complication, without long-term current use of insulin ([facility])  - HgbA1c= 5.7 in 1/22. Continue dietary/ lifestyle modifications, including decreased concentrated sweets and carbohydrates. - Comprehensive Metabolic Panel; Future  - Hemoglobin A1C; Future    4. Hamstring tightness of both lower extremities  - Moist heat . ROM exercises. Modify activities. 5. Hyperlipidemia, unspecified hyperlipidemia type  - Follow a low cholesterol diet, including cardiovascular exercise > 150 minutes/ week. - Comprehensive Metabolic Panel; Future  - Lipid Panel; Future    6. Parkinsonism, unspecified Parkinsonism type ([address]  - Stable. 7. DDD (degenerative disc disease), cervical  - Stable. Moist heat . ROM exercises. Modify activities. 8. Benign essential tremor  - Stable. Continue Propranolol LA 80 mg qd.         PLAN:  See orders for this visit as documented in the electronic medical record. Issues reviewed with her: low cholesterol diet, weight control and daily exercise discussed, home glucose monitoring emphasized, all medications, side effects and compliance discussed carefully, foot care discussed and Podiatry visits discussed, annual eye examinations at Ophthalmology discussed, glycohemoglobin and other lab monitoring discussed and long term diabetic complications discussed. Goals:   1) Blood pressure < 130/80  2) HGA1C ideal less than 6.5, at least less than 7.0  3) LDL cholesterol < 100  4) Exercise 150 minutes a week   5) Dilated eye exam by an optometrist or ophthalmologist once a year  6) Fasting blood sugar < 110  7) Glucose 2 hours after meal < 140  8) Aspirin 81 mg once a day  9) BMI (Body Mass Index) ideal < 25, at least less than 30. Weight loss of even 10 to 15 pounds is effective in improving diabetes  9) Total fat intake to less than 60 grams per day and saturated fat to less than 20 grams per day. 10) Diabetic education and diabetic nutrition classes. [phone] [id number]) Doctor visits every 3 months. Follow up 6 months/ prn. Medicare Annual Wellness Visit    Ting Bowers is here for Diabetes and Medicare AWV    Assessment & Plan   1. Routine general medical examination at a health care facility  - Reviewed prior labs with repeat due in 6/22. 2. Numbness and tingling in both hands  - discussed ergonomics and wearing wrist splint at night to avoid flares. - Wrist Cock-up    3. Type 2 diabetes mellitus without complication, without long-term current use of insulin ([facility])  - HgbA1c= 5.7 in 1/22. Continue dietary/ lifestyle modifications, including decreased concentrated sweets and carbohydrates. - Comprehensive Metabolic Panel; Future  - Hemoglobin A1C; Future    4. Hamstring tightness of both lower extremities  - Moist heat . ROM exercises. Modify activities.      5. Hyperlipidemia, unspecified hyperlipidemia type  - Follow a low cholesterol diet, including cardiovascular exercise > 150 minutes/ week. - Comprehensive Metabolic Panel; Future  - Lipid Panel; Future    6. Parkinsonism, unspecified Parkinsonism type ([address]  - Stable. 7. DDD (degenerative disc disease), cervical  - Stable. Moist heat . ROM exercises. Modify activities. 8. Benign essential tremor  - Stable. Continue Propranolol LA 80 mg qd. Recommendations for Preventive Services Due: see orders and patient instructions/AVS.  Recommended screening schedule for the next 5-10 years is provided to the patient in written form: see Patient Instructions/AVS.     Follow up 6 months/ prn. Subjective   The following acute and/or chronic problems were also addressed today:  See above. Patient's complete Health Risk Assessment and screening values have been reviewed and are found in Flowsheets. The following problems were reviewed today and where indicated follow up appointments were made and/or referrals ordered.     Positive Risk Factor Screenings with Interventions:             General Health and ACP:  General  In general, how would you say your health is?: Very Good  In the past 7 days, have you experienced any of the following: New or Increased Pain, New or Increased Fatigue, Loneliness, Social Isolation, Stress or Anger?: No  Do you get the social and emotional support that you need?: Yes  Do you have a Living Will?: Yes    Advance Directives     Power of  Living Will ACP-Advance Directive ACP-Power of     Not on File Not on File Not on File Not on File      General Health Risk Interventions:  · General Appearance: alert and oriented to person, place and time, well developed and well- nourished, in no acute distress  · Skin: warm and dry, no rash or erythema  · Head: normocephalic and atraumatic  · Eyes: pupils equal, round, and reactive to light, extraocular eye movements intact, conjunctivae normal  · ENT: tympanic membrane, external ear and ear canal normal bilaterally, nose without deformity, nasal mucosa and turbinates normal without polyps  · Neck: supple and non-tender without mass, no thyromegaly or thyroid nodules, no cervical lymphadenopathy  · Pulmonary/Chest: clear to auscultation bilaterally- no wheezes, rales or rhonchi, normal air movement, no respiratory distress  · Cardiovascular: normal rate, regular rhythm, normal S1 and S2, no murmurs, rubs, clicks, or gallops, distal pulses intact, no carotid bruits  · Abdomen: soft, non-tender, non-distended, normal bowel sounds, no masses or organomegaly  · Extremities: no cyanosis, clubbing or edema  · Musculoskeletal: normal range of motion, no joint swelling, deformity or tenderness  · Neurologic: reflexes normal and symmetric, no cranial nerve deficit, gait, coordination and speech normal              Objective   Vitals:    05/04/22 1534   BP: 138/70   Pulse: 55   Resp: 20   Temp: 97.1 °F (36.2 °C)   SpO2: 98%   Weight: 152 lb 6.4 oz (69.1 kg)   Height: 5' 7\" (1.702 m)      Body mass index is 23.87 kg/m².         General Appearance: alert and oriented to person, place and time, well developed and well- nourished, in no acute distress  Skin: warm and dry, no rash or erythema  Head: normocephalic and atraumatic  Eyes: pupils equal, round, and reactive to light, extraocular eye movements intact, conjunctivae normal  ENT: tympanic membrane, external ear and ear canal normal bilaterally, nose without deformity, nasal mucosa and turbinates normal without polyps  Neck: supple and non-tender without mass, no thyromegaly or thyroid nodules, no cervical lymphadenopathy  Pulmonary/Chest: clear to auscultation bilaterally- no wheezes, rales or rhonchi, normal air movement, no respiratory distress  Cardiovascular: normal rate, regular rhythm, normal S1 and S2, no murmurs, rubs, clicks, or gallops, distal pulses intact, no carotid bruits  Abdomen: soft, non-tender, non-distended, normal bowel sounds, no masses or organomegaly  Extremities: no cyanosis, clubbing or edema  Musculoskeletal: normal range of motion, no joint swelling, deformity or tenderness  Neurologic: reflexes normal and symmetric, no cranial nerve deficit, gait, coordination and speech normal       No Known Allergies  Prior to Visit Medications    Medication Sig Taking? Authorizing Provider   finasteride (PROSCAR) 5 MG tablet Take 5 mg by mouth daily. Yes Historical Provider, MD   Glucose Blood (BLOOD GLUCOSE TEST STRIPS) STRP Check Blood sugars qd Yes Maria Isabel Gordillo MD   Lancets MISC  Yes Maria Isabel Gordillo MD   propranolol (INDERAL LA) 80 MG CR capsule Take 80 mg by mouth 4 times daily. Yes Historical Provider, MD   terazosin (HYTRIN) 2 MG capsule Take 2 mg by mouth nightly. Yes Historical Provider, MD   Multiple Vitamin (MULTI-VITAMIN PO) Take 1 tablet by mouth daily. Yes Historical Provider, MD   Blood Glucose Monitoring Suppl AGUSTINA by Does not apply route.   Maria Isabel Gordillo MD       [facility] (Including outside providers/suppliers regularly involved in providing care):   Patient Care Team:  Maria Isabel Gordillo MD as PCP - General (Family Medicine)  Maria Isabel Gordillo MD as PCP - [facility] Empaneled Provider    Reviewed and updated this visit:  Tobacco  Allergies  Meds  Problems  Med Hx  Surg Hx  Soc Hx  Fam Hx

## 2022-05-04 NOTE — PATIENT INSTRUCTIONS
Patient Education        Hamstring Strain: Rehab Exercises  Introduction  Here are some examples of exercises for you to try. The exercises may be suggested for a condition or for rehabilitation. Start each exercise slowly. Ease off the exercises if you start to have pain. You will be told when to start these exercises and which ones will work bestfor you. How to do the exercises  Hamstring set (heel dig)    1. Sit with your affected leg bent. Your good leg should be straight and supported on the floor. 2. Tighten the muscles on the back of your bent leg (hamstring) by pressing your heel into the floor. 3. Hold for about 6 seconds, and then rest for up to 10 seconds. 4. Repeat 8 to 12 times. Hamstring curl    1. Lie on your stomach with your knees straight. Place a pillow under your stomach. If your kneecap is uncomfortable, roll up a washcloth and put it under your leg just above your kneecap. 2. Lift the foot of your affected leg by bending your knee so that you bring your foot up toward your buttock. If this motion hurts, try it without bending your knee quite as far. This may help you avoid any painful motion. 3. Slowly move your leg up and down. 4. Repeat 8 to 12 times. 5. When you can do this exercise with ease and no pain, add some resistance. To do this:  6. Tie the ends of an exercise band together to form a loop. Attach one end of the loop to a secure object or shut a door on it to hold it in place. (Or you can have someone hold one end of the loop to provide resistance.)  7. Loop the other end of the exercise band around the lower part of your affected leg. 8. Repeat steps 1 through 4, slowly pulling back on the exercise band with your leg. Hip extension    1. Stand facing a wall with your hands on the wall at about chest level. 2. Keeping the knee of your affected leg straight, kick that leg straight back behind you. 3. Relax, and lower your leg back to the starting position.   4. Repeat 8 to 12 times. 5. When you can do this exercise with ease and no pain, add some resistance. To do this:  6. Tie the ends of an exercise band together to form a loop. Attach one end of the loop to a secure object or shut a door on it to hold it in place. (Or you can have someone hold one end of the loop to provide resistance.)  7. Loop the other end of the exercise band around the lower part of your affected leg. 8. Repeat steps 1 through 4, slowly pulling back on the exercise band with your leg. Hamstring wall stretch    1. Lie on your back in a doorway, with your good leg through the open door. 2. Slide your affected leg up the wall to straighten your knee. You should feel a gentle stretch down the back of your leg. 3. Hold the stretch for at least 1 minute to begin. Then try to lengthen the time you hold the stretch to as long as 6 minutes. 4. Repeat 2 to 4 times. 5. If you do not have a place to do this exercise in a doorway, there is another way to do it:  6. Lie on your back, and bend the knee of your affected leg. 7. Loop a towel under the ball and toes of that foot, and hold the ends of the towel in your hands. 8. Straighten your knee, and slowly pull back on the towel. You should feel a gentle stretch down the back of your leg. 9. Hold the stretch for 15 to 30 seconds. Or even better, hold the stretch for 1 minute if you can. 10. Repeat 2 to 4 times. 1. Do not arch your back. 2. Do not bend either knee. 3. Keep one heel touching the floor and the other heel touching the wall. Do not point your toes. Calf stretch    1. Stand facing a wall with your hands on the wall at about eye level. Put your affected leg about a step behind your other leg. 2. Keeping your back leg straight and your back heel on the floor, bend your front knee and gently bring your hip and chest toward the wall until you feel a stretch in the calf of your back leg. 3. Hold the stretch for 15 to 30 seconds.   4. Repeat 2 to 4 times.  5. Repeat steps 1 through 4, but this time keep your back knee bent. Single-leg balance    1. Stand on a flat surface with your arms stretched out to your sides like you are making the letter \"T. \" Then lift your good leg off the floor, bending it at the knee. If you are not steady on your feet, use one hand to hold on to a chair, counter, or wall. 2. Standing on your affected leg, keep that knee straight. Try to balance on that leg for up to 30 seconds. Then rest for up to 10 seconds. 3. Repeat 6 to 8 times. 4. When you can balance on your affected leg for 30 seconds with your eyes open, try to balance on it with your eyes closed. 5. When you can do this exercise with your eyes closed for 30 seconds and with ease and no pain, try standing on a pillow or piece of foam, and repeat steps 1 through 4. Follow-up care is a key part of your treatment and safety. Be sure to make and go to all appointments, and call your doctor if you are having problems. It's also a good idea to know your test results and keep alist of the medicines you take. Where can you learn more? Go to https://"[employer]"pepicewMail[employer]Black.[employer] org and sign in to your [employer] account. Enter [id number] in the [facility] box to learn more about \"Hamstring Strain: Rehab Exercises. \"     If you do not have an account, please click on the \"Sign Up Now\" link. Current as of: July 1, 2021               Content Version: 13.2  © 2006-2022 Healthwise, Incorporated. Care instructions adapted under license by [facility] ([facility]). If you have questions about a medical condition or this instruction, always ask your healthcare professional. [place] [address] any warranty or liability for your use of this information. Patient Education        Carpal Tunnel Syndrome: Exercises  Introduction  Here are some examples of exercises for you to try. The exercises may be suggested for a condition or for rehabilitation.  Start each exercise slowly. Ease off the exercises if you start to have pain. You will be told when to start these exercises and which ones will work bestfor you. Warm-up stretches  When you no longer have pain or numbness, you can do exercises to help prevent carpal tunnel syndrome from coming back. Do not do any stretch or movement thatis uncomfortable or painful. 1. Rotate your wrist up, down, and from side to side. Repeat 4 times. 2. Stretch your fingers far apart. Relax them, and then stretch them again. Repeat 4 times. 3. Stretch your thumb by pulling it back gently, holding it, and then releasing it. Repeat 4 times. How to do the exercises  Prayer stretch    1. Start with your palms together in front of your chest just below your chin. 2. Slowly lower your hands toward your waistline, keeping your hands close to your stomach and your palms together until you feel a mild to moderate stretch under your forearms. 3. Hold for at least 15 to 30 seconds. Repeat 2 to 4 times. Wrist flexor stretch    1. Extend your arm in front of you with your palm up. 2. Bend your wrist, pointing your hand toward the floor. 3. With your other hand, gently bend your wrist farther until you feel a mild to moderate stretch in your forearm. 4. Hold for at least 15 to 30 seconds. Repeat 2 to 4 times. Wrist extensor stretch    1. Repeat steps 1 through 4 of the stretch above, but begin with your extended hand palm down. Follow-up care is a key part of your treatment and safety. Be sure to make and go to all appointments, and call your doctor if you are having problems. It's also a good idea to know your test results and keep alist of the medicines you take. Where can you learn more? Go to https://[employer]ie."[employer],". org and sign in to your [employer] account. Enter R007 in the [facility] box to learn more about \"Carpal Tunnel Syndrome: Exercises. \"     If you do not have an account, please click on the \"Sign Up Now\" link.  Current as of: July 1, 2021               Content Version: 13.2  © 7982-7686 Healthwise, [employer]. Care instructions adapted under license by [facility] ([facility]). If you have questions about a medical condition or this instruction, always ask your healthcare professional. [address] any warranty or liability for your use of this information. Personalized Preventive Plan for Gunner Nuñez - 5/4/2022  Medicare offers a range of preventive health benefits. Some of the tests and screenings are paid in full while other may be subject to a deductible, co-insurance, and/or copay. Some of these benefits include a comprehensive review of your medical history including lifestyle, illnesses that may run in your family, and various assessments and screenings as appropriate. After reviewing your medical record and screening and assessments performed today your provider may have ordered immunizations, labs, imaging, and/or referrals for you. A list of these orders (if applicable) as well as your Preventive Care list are included within your After Visit Summary for your review. Other Preventive Recommendations:    · A preventive eye exam performed by an eye specialist is recommended every 1-2 years to screen for glaucoma; cataracts, macular degeneration, and other eye disorders. · A preventive dental visit is recommended every 6 months. · Try to get at least 150 minutes of exercise per week or 10,000 steps per day on a pedometer . · Order or download the FREE \"Exercise & Physical Activity: Your Everyday Guide\" from [employer]. Call 3-494.720.7727 or search [employer] online. · You need 9179-8288 mg of calcium and 4102-2436 IU of vitamin D per day.  It is possible to meet your calcium requirement with diet alone, but a vitamin D supplement is usually necessary to meet this goal.  · When exposed to the sun, use a sunscreen that protects against both UVA and UVB radiation with an SPF of 30 or greater. Reapply every 2 to 3 hours or after sweating, drying off with a towel, or swimming. · Always wear a seat belt when traveling in a car. Always wear a helmet when riding a bicycle or motorcycle.

## 2022-05-06 LAB
A/G RATIO: 1.5 (CALC) (ref 1–2.5)
ALBUMIN SERPL-MCNC: 3.7 G/DL (ref 3.6–5.1)
ALP BLD-CCNC: 57 U/L (ref 35–144)
ALT SERPL-CCNC: 18 U/L (ref 9–46)
AST SERPL-CCNC: 18 U/L (ref 10–35)
BILIRUB SERPL-MCNC: 0.9 MG/DL (ref 0.2–1.2)
BUN / CREAT RATIO: ABNORMAL (CALC) (ref 6–22)
BUN BLDV-MCNC: 24 MG/DL (ref 7–25)
CALCIUM SERPL-MCNC: 9.2 MG/DL (ref 8.6–10.3)
CHLORIDE BLD-SCNC: 105 MMOL/L (ref 98–110)
CHOLESTEROL, TOTAL: 190 MG/DL
CHOLESTEROL/HDL RATIO: 3.2 (CALC)
CO2: 29 MMOL/L (ref 20–32)
CREAT SERPL-MCNC: 0.99 MG/DL (ref 0.7–1.11)
GFR AFRICAN AMERICAN: 74 ML/MIN/1.73M2
GFR NON-AFRICAN AMERICAN: 64 ML/MIN/1.73M2
GLOBULIN: 2.4 G/DL (CALC) (ref 1.9–3.7)
GLUCOSE BLD-MCNC: 121 MG/DL (ref 65–99)
HBA1C MFR BLD: 5.8 % OF TOTAL HGB
HDLC SERPL-MCNC: 60 MG/DL
LDL CHOLESTEROL CALCULATED: 114 MG/DL (CALC)
NONHDLC SERPL-MCNC: 130 MG/DL (CALC)
POTASSIUM SERPL-SCNC: 4.2 MMOL/L (ref 3.5–5.3)
SODIUM BLD-SCNC: 140 MMOL/L (ref 135–146)
TOTAL PROTEIN: 6.1 G/DL (ref 6.1–8.1)
TRIGL SERPL-MCNC: 64 MG/DL

## 2022-06-27 LAB — DIABETIC RETINOPATHY: NEGATIVE

## 2022-11-04 ENCOUNTER — OFFICE VISIT (OUTPATIENT)
Dept: FAMILY MEDICINE CLINIC | Age: 87
End: 2022-11-04
Payer: MEDICARE

## 2022-11-04 VITALS
BODY MASS INDEX: 23.65 KG/M2 | DIASTOLIC BLOOD PRESSURE: 65 MMHG | TEMPERATURE: 97.8 F | WEIGHT: 151 LBS | RESPIRATION RATE: 12 BRPM | SYSTOLIC BLOOD PRESSURE: 120 MMHG | OXYGEN SATURATION: 95 % | HEART RATE: 68 BPM

## 2022-11-04 DIAGNOSIS — G25.0 BENIGN ESSENTIAL TREMOR: ICD-10-CM

## 2022-11-04 DIAGNOSIS — G20 PARKINSONISM, UNSPECIFIED PARKINSONISM TYPE (HCC): ICD-10-CM

## 2022-11-04 DIAGNOSIS — N40.0 BENIGN PROSTATIC HYPERPLASIA WITHOUT LOWER URINARY TRACT SYMPTOMS: ICD-10-CM

## 2022-11-04 DIAGNOSIS — E11.9 TYPE 2 DIABETES MELLITUS WITHOUT COMPLICATION, WITHOUT LONG-TERM CURRENT USE OF INSULIN (HCC): Primary | ICD-10-CM

## 2022-11-04 DIAGNOSIS — M50.30 DDD (DEGENERATIVE DISC DISEASE), CERVICAL: ICD-10-CM

## 2022-11-04 DIAGNOSIS — E78.5 HYPERLIPIDEMIA, UNSPECIFIED HYPERLIPIDEMIA TYPE: ICD-10-CM

## 2022-11-04 PROCEDURE — 99214 OFFICE O/P EST MOD 30 MIN: CPT | Performed by: FAMILY MEDICINE

## 2022-11-04 PROCEDURE — 1123F ACP DISCUSS/DSCN MKR DOCD: CPT | Performed by: FAMILY MEDICINE

## 2022-11-04 PROCEDURE — G8484 FLU IMMUNIZE NO ADMIN: HCPCS | Performed by: FAMILY MEDICINE

## 2022-11-04 PROCEDURE — G8420 CALC BMI NORM PARAMETERS: HCPCS | Performed by: FAMILY MEDICINE

## 2022-11-04 PROCEDURE — G8427 DOCREV CUR MEDS BY ELIG CLIN: HCPCS | Performed by: FAMILY MEDICINE

## 2022-11-04 PROCEDURE — 1036F TOBACCO NON-USER: CPT | Performed by: FAMILY MEDICINE

## 2022-11-04 PROCEDURE — 3044F HG A1C LEVEL LT 7.0%: CPT | Performed by: FAMILY MEDICINE

## 2022-11-04 RX ORDER — FINASTERIDE 5 MG/1
5 TABLET, FILM COATED ORAL DAILY
Qty: 30 TABLET | Status: CANCELLED | OUTPATIENT
Start: 2022-11-04

## 2022-11-04 RX ORDER — PROPRANOLOL HYDROCHLORIDE 80 MG/1
80 CAPSULE, EXTENDED RELEASE ORAL 4 TIMES DAILY
Qty: 30 CAPSULE | Status: CANCELLED | OUTPATIENT
Start: 2022-11-04

## 2022-11-04 RX ORDER — TERAZOSIN 2 MG/1
2 CAPSULE ORAL NIGHTLY
Qty: 30 CAPSULE | Status: CANCELLED | OUTPATIENT
Start: 2022-11-04

## 2022-11-04 NOTE — PROGRESS NOTES
SUBJECTIVE:  80 y.o.. male  for follow up of diabetes, hypertension, and hypercholesterolemia. Diabetic Review of Systems - medication compliance: noncompliant some of the time, diabetic diet compliance: noncompliant some of the time, home glucose monitoring: is not performed, further diabetic ROS: no polyuria or polydipsia, no chest pain, dyspnea or TIA's, no numbness, tingling or pain in extremities, last eye exam approximately 6/22. Using treadmill 40 minutes/ day - 3-4 d/ week at [employer]. Denies fever, chest pain , shortness of breath or cough. Current Outpatient Medications   Medication Sig Dispense Refill    finasteride (PROSCAR) 5 MG tablet Take 5 mg by mouth daily. propranolol (INDERAL LA) 80 MG CR capsule Take 80 mg by mouth 4 times daily. terazosin (HYTRIN) 2 MG capsule Take 2 mg by mouth nightly. Glucose Blood (BLOOD GLUCOSE TEST STRIPS) STRP Check Blood sugars qd (Patient not taking: Reported on 11/4/2022) 100 strip 5    Blood Glucose Monitoring Suppl AGUSTINA by Does not apply route. 1 Device 0    Lancets MISC  (Patient not taking: Reported on 11/4/2022) 100 each 5    Multiple Vitamin (MULTI-VITAMIN PO) Take 1 tablet by mouth daily. (Patient not taking: Reported on 11/4/2022)       No current facility-administered medications for this visit. ROS: All other systems were reviewed and were negative . Patient's allergies and medications were reviewed. Patient's past medical, surgical, social , and family history were reviewed. OBJECTIVE:  /65   Pulse 68   Temp 97.8 °F (36.6 °C) (Temporal)   Resp 12   Wt 151 lb (68.5 kg)   SpO2 95%   BMI 23.65 kg/m²   General: NAD, cooperative, alert and oriented X 3, affect / mood is good. Good insight. well hydrated. Neck : no lymphadenopathy, supple, FROM  CV: Regular rate and rhythm , no murmurs/ rub/ gallop. No edema.    Lungs : CTA bilaterally, breathing comfortably  Abdomen: positive bowel sounds, soft , non tender, non distended. No hepatosplenomegaly. Feet: normal sensation to monofilament bilaterally, no ulcers. DP/ PT pulses normal.   Skin: no rashes. Non tender. ASSESSMENT:  1. Type 2 diabetes mellitus without complication, without long-term current use of insulin (HCC)  - Continue dietary/ lifestyle modifications, including decreased concentrated sweets and carbohydrates. - Comprehensive Metabolic Panel; Future  - Hemoglobin A1C; Future    2. Benign essential tremor  - Stable Followed by Neurologist at [facility]. - Continue Propanolol LA 80 mg qid . 3. Parkinsonism, unspecified Parkinsonism type ([address]  - Followed by Neurologist, who he usually sees in the fall. 4. Benign prostatic hyperplasia without lower urinary tract symptoms  - Stable. 5. Hyperlipidemia, unspecified hyperlipidemia type  - Follow a low cholesterol diet, including cardiovascular exercise > 150 minutes/ week. - Comprehensive Metabolic Panel; Future  - Lipid Panel; Future    6. DDD (degenerative disc disease), cervical  - stable. Moist heat . ROM exercises. Modify activities. PLAN:  See orders for this visit as documented in the electronic medical record. Issues reviewed with her: low cholesterol diet, weight control and daily exercise discussed, home glucose monitoring emphasized, all medications, side effects and compliance discussed carefully, foot care discussed and Podiatry visits discussed, annual eye examinations at Ophthalmology discussed, glycohemoglobin and other lab monitoring discussed, and long term diabetic complications discussed.     Goals:   1) Blood pressure < 130/80  2) HGA1C ideal less than 6.5, at least less than 7.0  3) LDL cholesterol < 100  4) Exercise 150 minutes a week   5) Dilated eye exam by an optometrist or ophthalmologist once a year  6) Fasting blood sugar < 110  7) Glucose 2 hours after meal < 140  8) Aspirin 81 mg once a day  9) BMI (Body Mass Index) ideal < 25, at least less than 30. Weight loss of even 10 to 15 pounds is effective in improving diabetes  9) Total fat intake to less than 60 grams per day and saturated fat to less than 20 grams per day. 10) Diabetic education and diabetic nutrition classes. [phone]) Doctor visits every 3 months. Follow up 6 months/ prn.

## 2022-11-08 LAB
A/G RATIO: 1.7 (CALC) (ref 1–2.5)
ALBUMIN SERPL-MCNC: 3.9 G/DL (ref 3.6–5.1)
ALP BLD-CCNC: 52 U/L (ref 35–144)
ALT SERPL-CCNC: 17 U/L (ref 9–46)
AST SERPL-CCNC: 18 U/L (ref 10–35)
BILIRUB SERPL-MCNC: 0.8 MG/DL (ref 0.2–1.2)
BUN / CREAT RATIO: ABNORMAL (CALC) (ref 6–22)
BUN BLDV-MCNC: 24 MG/DL (ref 7–25)
CALCIUM SERPL-MCNC: 9.3 MG/DL (ref 8.6–10.3)
CHLORIDE BLD-SCNC: 104 MMOL/L (ref 98–110)
CHOLESTEROL, TOTAL: 200 MG/DL
CHOLESTEROL/HDL RATIO: 3.6 (CALC)
CO2: 30 MMOL/L (ref 20–32)
CREAT SERPL-MCNC: 0.98 MG/DL (ref 0.7–1.22)
ESTIMATED GLOMERULAR FILTRATION RATE CREATININE EQUATION: 70 ML/MIN/1.73M2
GLOBULIN: 2.3 G/DL (CALC) (ref 1.9–3.7)
GLUCOSE BLD-MCNC: 107 MG/DL (ref 65–99)
HBA1C MFR BLD: 5.8 % OF TOTAL HGB
HDLC SERPL-MCNC: 56 MG/DL
LDL CHOLESTEROL CALCULATED: 126 MG/DL (CALC)
NONHDLC SERPL-MCNC: 144 MG/DL (CALC)
POTASSIUM SERPL-SCNC: 4.3 MMOL/L (ref 3.5–5.3)
SODIUM BLD-SCNC: 140 MMOL/L (ref 135–146)
TOTAL PROTEIN: 6.2 G/DL (ref 6.1–8.1)
TRIGL SERPL-MCNC: 79 MG/DL

## 2023-03-23 ENCOUNTER — TELEPHONE (OUTPATIENT)
Dept: FAMILY MEDICINE CLINIC | Age: 88
End: 2023-03-23

## 2023-05-03 PROBLEM — L98.491 ULCER OF EXTERNAL EAR, LIMITED TO BREAKDOWN OF SKIN (HCC): Status: ACTIVE | Noted: 2023-05-03

## 2023-11-09 ENCOUNTER — OFFICE VISIT (OUTPATIENT)
Age: 88
End: 2023-11-09

## 2023-11-09 VITALS
HEIGHT: 67 IN | TEMPERATURE: 98.4 F | SYSTOLIC BLOOD PRESSURE: 130 MMHG | RESPIRATION RATE: 14 BRPM | BODY MASS INDEX: 25.11 KG/M2 | DIASTOLIC BLOOD PRESSURE: 76 MMHG | OXYGEN SATURATION: 97 % | WEIGHT: 160 LBS | HEART RATE: 64 BPM

## 2023-11-09 DIAGNOSIS — G25.0 BENIGN ESSENTIAL TREMOR: ICD-10-CM

## 2023-11-09 DIAGNOSIS — E78.5 HYPERLIPIDEMIA, UNSPECIFIED HYPERLIPIDEMIA TYPE: ICD-10-CM

## 2023-11-09 DIAGNOSIS — E11.9 TYPE 2 DIABETES MELLITUS WITHOUT COMPLICATION, WITHOUT LONG-TERM CURRENT USE OF INSULIN (HCC): Primary | ICD-10-CM

## 2023-11-09 DIAGNOSIS — Z23 NEED FOR SHINGLES VACCINE: ICD-10-CM

## 2023-11-09 DIAGNOSIS — N40.0 BENIGN PROSTATIC HYPERPLASIA WITHOUT LOWER URINARY TRACT SYMPTOMS: ICD-10-CM

## 2023-11-09 DIAGNOSIS — Z23 NEED FOR COVID-19 VACCINE: ICD-10-CM

## 2023-11-09 DIAGNOSIS — M50.30 DDD (DEGENERATIVE DISC DISEASE), CERVICAL: ICD-10-CM

## 2023-11-09 DIAGNOSIS — E11.9 TYPE 2 DIABETES MELLITUS WITHOUT COMPLICATION, WITHOUT LONG-TERM CURRENT USE OF INSULIN (HCC): ICD-10-CM

## 2023-11-09 DIAGNOSIS — G20 PARKINSONISM, UNSPECIFIED PARKINSONISM TYPE: ICD-10-CM

## 2023-11-09 PROBLEM — L98.491 ULCER OF EXTERNAL EAR, LIMITED TO BREAKDOWN OF SKIN (HCC): Status: RESOLVED | Noted: 2023-05-03 | Resolved: 2023-11-09

## 2023-11-09 LAB
ALBUMIN SERPL-MCNC: 3.9 G/DL (ref 3.4–5)
ALBUMIN/GLOB SERPL: 1.7 {RATIO} (ref 1.1–2.2)
ALP SERPL-CCNC: 76 U/L (ref 40–129)
ALT SERPL-CCNC: 16 U/L (ref 10–40)
ANION GAP SERPL CALCULATED.3IONS-SCNC: 7 MMOL/L (ref 3–16)
AST SERPL-CCNC: 16 U/L (ref 15–37)
BILIRUB SERPL-MCNC: 0.4 MG/DL (ref 0–1)
BUN SERPL-MCNC: 21 MG/DL (ref 7–20)
CALCIUM SERPL-MCNC: 9.3 MG/DL (ref 8.3–10.6)
CHLORIDE SERPL-SCNC: 103 MMOL/L (ref 99–110)
CO2 SERPL-SCNC: 32 MMOL/L (ref 21–32)
CREAT SERPL-MCNC: 1.2 MG/DL (ref 0.8–1.3)
DEPRECATED RDW RBC AUTO: 13.6 % (ref 12.4–15.4)
GFR SERPLBLD CREATININE-BSD FMLA CKD-EPI: 55 ML/MIN/{1.73_M2}
GLUCOSE SERPL-MCNC: 171 MG/DL (ref 70–99)
HCT VFR BLD AUTO: 37.2 % (ref 40.5–52.5)
HGB BLD-MCNC: 12.9 G/DL (ref 13.5–17.5)
MCH RBC QN AUTO: 34 PG (ref 26–34)
MCHC RBC AUTO-ENTMCNC: 34.7 G/DL (ref 31–36)
MCV RBC AUTO: 98 FL (ref 80–100)
PLATELET # BLD AUTO: 156 K/UL (ref 135–450)
PMV BLD AUTO: 9.9 FL (ref 5–10.5)
POTASSIUM SERPL-SCNC: 4.6 MMOL/L (ref 3.5–5.1)
PROT SERPL-MCNC: 6.2 G/DL (ref 6.4–8.2)
RBC # BLD AUTO: 3.79 M/UL (ref 4.2–5.9)
SODIUM SERPL-SCNC: 142 MMOL/L (ref 136–145)
WBC # BLD AUTO: 8.6 K/UL (ref 4–11)

## 2023-11-09 SDOH — ECONOMIC STABILITY: HOUSING INSECURITY
IN THE LAST 12 MONTHS, WAS THERE A TIME WHEN YOU DID NOT HAVE A STEADY PLACE TO SLEEP OR SLEPT IN A SHELTER (INCLUDING NOW)?: NO

## 2023-11-09 SDOH — ECONOMIC STABILITY: FOOD INSECURITY: WITHIN THE PAST 12 MONTHS, THE FOOD YOU BOUGHT JUST DIDN'T LAST AND YOU DIDN'T HAVE MONEY TO GET MORE.: NEVER TRUE

## 2023-11-09 SDOH — ECONOMIC STABILITY: INCOME INSECURITY: HOW HARD IS IT FOR YOU TO PAY FOR THE VERY BASICS LIKE FOOD, HOUSING, MEDICAL CARE, AND HEATING?: NOT HARD AT ALL

## 2023-11-09 SDOH — ECONOMIC STABILITY: FOOD INSECURITY: WITHIN THE PAST 12 MONTHS, YOU WORRIED THAT YOUR FOOD WOULD RUN OUT BEFORE YOU GOT MONEY TO BUY MORE.: NEVER TRUE

## 2023-11-09 NOTE — PROGRESS NOTES
SUBJECTIVE:  80 y.o.. male  for follow up of diabetes, hypertension, and hypercholesterolemia. Diabetic Review of Systems - medication compliance: compliant most of the time, diabetic diet compliance: noncompliant some of the time, home glucose monitoring: fasting values range - not checking ; further diabetic ROS: no polyuria or polydipsia, no chest pain, dyspnea or TIA's, no numbness, tingling or pain in extremities, last eye exam approximately 3 months ago at Dr. Slater Friday. He golfs on Tuesdays at 9:30 am.  Golf season ended in 11/1/23 and now weather permitting. Resumes regular season in 3/1/24. He is with a group 20-30 with ages 49-81 yo. He was doing 45 minutes on the treadmill and now doing 30 minutes of treadmill and 20 minutes on the arm machine for the past 3 months. 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 Other abel talked about liberation of concentration camp. He lives in [facility]. He goes to a restaurant in [employer] is freee once per moth. He did [employer] 2+ years ago with his daughter and his son went with one of his  friends. He totally enjoyed it. Current Outpatient Medications   Medication Sig Dispense Refill    finasteride (PROSCAR) 5 MG tablet Take 1 tablet by mouth daily      propranolol (INDERAL LA) 80 MG CR capsule Take 1 capsule by mouth 4 times daily      terazosin (HYTRIN) 2 MG capsule Take 1 capsule by mouth nightly      Multiple Vitamin (MULTI-VITAMIN PO) Take 1 tablet by mouth daily       No current facility-administered medications for this visit. ROS: All other systems were reviewed and were negative . Patient's allergies and medications were reviewed. Patient's past medical, surgical, social , and family history were reviewed. OBJECTIVE:  There were no vitals taken for this visit. General: NAD, cooperative, alert and oriented X 3, affect / mood is good. Good insight. well hydrated.   Neck : no

## 2023-11-10 LAB
EST. AVERAGE GLUCOSE BLD GHB EST-MCNC: 134.1 MG/DL
HBA1C MFR BLD: 6.3 %

## 2024-03-27 ENCOUNTER — OFFICE VISIT (OUTPATIENT)
Age: 89
End: 2024-03-27
Payer: MEDICARE

## 2024-03-27 VITALS
WEIGHT: 158 LBS | SYSTOLIC BLOOD PRESSURE: 130 MMHG | BODY MASS INDEX: 24.8 KG/M2 | TEMPERATURE: 97.7 F | RESPIRATION RATE: 16 BRPM | HEIGHT: 67 IN | HEART RATE: 69 BPM | DIASTOLIC BLOOD PRESSURE: 76 MMHG | OXYGEN SATURATION: 96 %

## 2024-03-27 DIAGNOSIS — G25.0 BENIGN ESSENTIAL TREMOR: ICD-10-CM

## 2024-03-27 DIAGNOSIS — N63.42 SUBAREOLAR MASS OF LEFT BREAST: Primary | ICD-10-CM

## 2024-03-27 DIAGNOSIS — G20.C PARKINSONISM, UNSPECIFIED PARKINSONISM TYPE (HCC): ICD-10-CM

## 2024-03-27 DIAGNOSIS — R26.89 BALANCE PROBLEMS: ICD-10-CM

## 2024-03-27 DIAGNOSIS — E11.9 TYPE 2 DIABETES MELLITUS WITHOUT COMPLICATION, WITHOUT LONG-TERM CURRENT USE OF INSULIN (HCC): ICD-10-CM

## 2024-03-27 PROCEDURE — G8420 CALC BMI NORM PARAMETERS: HCPCS | Performed by: FAMILY MEDICINE

## 2024-03-27 PROCEDURE — G8484 FLU IMMUNIZE NO ADMIN: HCPCS | Performed by: FAMILY MEDICINE

## 2024-03-27 PROCEDURE — 1036F TOBACCO NON-USER: CPT | Performed by: FAMILY MEDICINE

## 2024-03-27 PROCEDURE — 1123F ACP DISCUSS/DSCN MKR DOCD: CPT | Performed by: FAMILY MEDICINE

## 2024-03-27 PROCEDURE — G8427 DOCREV CUR MEDS BY ELIG CLIN: HCPCS | Performed by: FAMILY MEDICINE

## 2024-03-27 PROCEDURE — 99214 OFFICE O/P EST MOD 30 MIN: CPT | Performed by: FAMILY MEDICINE

## 2024-03-27 ASSESSMENT — PATIENT HEALTH QUESTIONNAIRE - PHQ9
SUM OF ALL RESPONSES TO PHQ9 QUESTIONS 1 & 2: 0
2. FEELING DOWN, DEPRESSED OR HOPELESS: NOT AT ALL
SUM OF ALL RESPONSES TO PHQ QUESTIONS 1-9: 0
1. LITTLE INTEREST OR PLEASURE IN DOING THINGS: NOT AT ALL
SUM OF ALL RESPONSES TO PHQ QUESTIONS 1-9: 0

## 2024-03-27 NOTE — PROGRESS NOTES
Patient is here for lump to chest - left sided.  Above nipple.  Usually not tender .  No drainage.  He noticed it when touching chest about 3 weeks ago.    Denies fever, chest pain , shortness of breath or palpitations.  No discharge from nipple.       He fell twice in the past several weeks. One time he tripped and the other time he fell while on escalator at the airport with his suitcase .  He is golfing once per week if weather is permitting.  He is in a group of 15 on  usually , but will move for weather.  He last played yesterday - 18 holes.  He uses a cart but has to walk a lot.   He is more cautious with walking.  He fell on an escalator at the airport when going to [place] for .  He flew with son and daughter in law.  He is to fly to [place] likely in  to visit his brother (97 yo), whose son will meet him at the airport. Denies fever, chest pain , shortness of breath or palpitations.       Review of Systems    ROS: All other systems were reviewed and are negative .  Patient's allergies and medications were reviewed.  Patient's past medical, surgical, social , and family history were reviewed.    Wt Readings from Last 3 Encounters:   24 71.7 kg (158 lb)   23 72.6 kg (160 lb)   23 68.7 kg (151 lb 8 oz)       OBJECTIVE:  /76 (Site: Right Upper Arm, Position: Sitting, Cuff Size: Medium Adult)   Pulse 69   Temp 97.7 °F (36.5 °C) (Temporal)   Resp 16   Ht 1.702 m (5' 7\")   Wt 71.7 kg (158 lb)   SpO2 96%   BMI 24.75 kg/m²     Physical Exam    General: NAD, cooperative, alert and oriented X 3. Mood / affect is good.good insight. well hydrated.  Neck : no lymphadenopathy, supple, FROM  Breasts . Right with no dominant, discrete, fixed  or suspicious masses are noted. Left breast with fullness/ questionable lump subareolar . Non tender. No erythema.  No skin or nipple changes or axillary nodes.   CV: Regular rate and rhythm , no murmurs/ rub/ gallop. No edema.

## 2024-05-06 ENCOUNTER — HOSPITAL ENCOUNTER (OUTPATIENT)
Dept: ULTRASOUND IMAGING | Age: 89
Discharge: HOME | End: 2024-05-06
Payer: MEDICARE

## 2024-05-06 DIAGNOSIS — N63.42 SUBAREOLAR MASS OF LEFT BREAST: ICD-10-CM

## 2024-05-06 PROCEDURE — 76642 ULTRASOUND BREAST LIMITED: CPT

## 2024-05-09 ENCOUNTER — TELEPHONE (OUTPATIENT)
Dept: CARDIOLOGY CLINIC | Age: 89
End: 2024-05-09

## 2024-05-09 ENCOUNTER — OFFICE VISIT (OUTPATIENT)
Age: 89
End: 2024-05-09

## 2024-05-09 VITALS
DIASTOLIC BLOOD PRESSURE: 76 MMHG | RESPIRATION RATE: 16 BRPM | BODY MASS INDEX: 24.64 KG/M2 | SYSTOLIC BLOOD PRESSURE: 128 MMHG | OXYGEN SATURATION: 98 % | HEART RATE: 62 BPM | TEMPERATURE: 98.2 F | WEIGHT: 157 LBS | HEIGHT: 67 IN

## 2024-05-09 DIAGNOSIS — E11.9 TYPE 2 DIABETES MELLITUS WITHOUT COMPLICATION, WITHOUT LONG-TERM CURRENT USE OF INSULIN (HCC): ICD-10-CM

## 2024-05-09 DIAGNOSIS — E78.5 HYPERLIPIDEMIA, UNSPECIFIED HYPERLIPIDEMIA TYPE: ICD-10-CM

## 2024-05-09 DIAGNOSIS — Z00.00 MEDICARE ANNUAL WELLNESS VISIT, SUBSEQUENT: ICD-10-CM

## 2024-05-09 DIAGNOSIS — N40.0 BENIGN PROSTATIC HYPERPLASIA WITHOUT LOWER URINARY TRACT SYMPTOMS: ICD-10-CM

## 2024-05-09 DIAGNOSIS — Z00.00 ENCOUNTER FOR SUBSEQUENT ANNUAL WELLNESS VISIT (AWV) IN MEDICARE PATIENT: Primary | ICD-10-CM

## 2024-05-09 DIAGNOSIS — N63.42 SUBAREOLAR MASS OF LEFT BREAST: ICD-10-CM

## 2024-05-09 DIAGNOSIS — M50.30 DDD (DEGENERATIVE DISC DISEASE), CERVICAL: ICD-10-CM

## 2024-05-09 DIAGNOSIS — R55 NEAR SYNCOPE: ICD-10-CM

## 2024-05-09 DIAGNOSIS — G25.0 BENIGN ESSENTIAL TREMOR: ICD-10-CM

## 2024-05-09 DIAGNOSIS — G20.C PARKINSONISM, UNSPECIFIED PARKINSONISM TYPE (HCC): ICD-10-CM

## 2024-05-09 LAB
ALBUMIN SERPL-MCNC: 3.9 G/DL (ref 3.4–5)
ALBUMIN/GLOB SERPL: 1.5 {RATIO} (ref 1.1–2.2)
ALP SERPL-CCNC: 75 U/L (ref 40–129)
ALT SERPL-CCNC: 16 U/L (ref 10–40)
ANION GAP SERPL CALCULATED.3IONS-SCNC: 13 MMOL/L (ref 3–16)
AST SERPL-CCNC: 19 U/L (ref 15–37)
BILIRUB SERPL-MCNC: 0.5 MG/DL (ref 0–1)
BUN SERPL-MCNC: 27 MG/DL (ref 7–20)
CALCIUM SERPL-MCNC: 9.3 MG/DL (ref 8.3–10.6)
CHLORIDE SERPL-SCNC: 106 MMOL/L (ref 99–110)
CO2 SERPL-SCNC: 23 MMOL/L (ref 21–32)
CREAT SERPL-MCNC: 1 MG/DL (ref 0.8–1.3)
GFR SERPLBLD CREATININE-BSD FMLA CKD-EPI: 68 ML/MIN/{1.73_M2}
GLUCOSE SERPL-MCNC: 155 MG/DL (ref 70–99)
POTASSIUM SERPL-SCNC: 4.6 MMOL/L (ref 3.5–5.1)
PROT SERPL-MCNC: 6.5 G/DL (ref 6.4–8.2)
SODIUM SERPL-SCNC: 142 MMOL/L (ref 136–145)

## 2024-05-09 ASSESSMENT — PATIENT HEALTH QUESTIONNAIRE - PHQ9
2. FEELING DOWN, DEPRESSED OR HOPELESS: NOT AT ALL
SUM OF ALL RESPONSES TO PHQ QUESTIONS 1-9: 0
SUM OF ALL RESPONSES TO PHQ9 QUESTIONS 1 & 2: 0
SUM OF ALL RESPONSES TO PHQ QUESTIONS 1-9: 0
1. LITTLE INTEREST OR PLEASURE IN DOING THINGS: NOT AT ALL

## 2024-05-09 ASSESSMENT — LIFESTYLE VARIABLES
HOW MANY STANDARD DRINKS CONTAINING ALCOHOL DO YOU HAVE ON A TYPICAL DAY: 1 OR 2
HOW OFTEN DO YOU HAVE A DRINK CONTAINING ALCOHOL: 2-4 TIMES A MONTH

## 2024-05-09 NOTE — TELEPHONE ENCOUNTER
SCREENING QUESTIONS:       Do you have a history of cardiovascular disease, this includes any of the following- heart stent and/or open-heart surgery, stroke or abdominal aortic aneurysm? No (If the answer is yes please do not schedule)     Are you currently following up with a cardiologist? No     If no, would you like our office to contact you? No        Do you have a family history of abdominal aortic aneurysm, heart attack or stroke? No    Do you have high cholesterol? Yes    Do you have high blood pressure? No    Do you have diabetes? Yes    Do you currently smoke or are you a former smoker? No      WRAP UP:     PLEASE CHECK EACH ONE     Referred by: Dr. Ferrell    [x] Scheduled on 5/21 at 10AM at the [place] office    [x] Instructions given to patient- Nothing to eat or drink 8 hrs prior to appointment and wear loose, comfortable clothing    [] Not scheduled due to previous history themselves    [] Sent to  pool, has a cardiac history and is not following a cardiologist, would like a follow call

## 2024-05-09 NOTE — PROGRESS NOTES
Fasting blood sugar < 110  7) Glucose 2 hours after meal < 140  8) Aspirin 81 mg once a day  9) BMI (Body Mass Index) ideal < 25, at least less than 30.  Weight loss of even 10 to 15 pounds is effective in improving diabetes  9) Total fat intake to less than 60 grams per day and saturated fat to less than 20 grams per day.    10) Diabetic education and diabetic nutrition classes.  [phone]   11) Doctor visits every 3 months.    CareTeam (Including outside providers/suppliers regularly involved in providing care):   Patient Care Team:  Missy Ferrell MD as PCP - General (Family Medicine)  Missy Ferrell MD as PCP - Empaneled Provider     Reviewed and updated this visit:  Tobacco  Allergies  Meds  Problems  Med Hx  Surg Hx  Soc Hx  Fam Hx         Follow up 3 months/ prn.     WELLNESS ASSESSMENT SCORES:     Nutrition Screener:  18   ( 0-8 indicates poor diet quality;  9-19 indicates average diet quality; 20-28 indicates good quality diet)  JEFFREY-7 Anxiety : 0    (0-4=minimal anxiety; 5-9 = mild anxiety: 10-14 = moderate anxiety ; 15-21 = severe anxiety)  Patient Health Questionaire (PHQ-9): 0     (0-4=minimal depression; 5-9= mild; 10-14=moderate; 15-19=moderately severe; 20-27=severe depression)  Insomnia Severity Index: 3   (0-7 = no significant insomnia; 8-14 = subthreshold insomnia;  15-21 = moderate clinical insomnia; 22-28 severe clinical insomnia)

## 2024-05-10 LAB
EST. AVERAGE GLUCOSE BLD GHB EST-MCNC: 131.2 MG/DL
HBA1C MFR BLD: 6.2 %

## 2024-07-01 ENCOUNTER — OFFICE VISIT (OUTPATIENT)
Age: 89
End: 2024-07-01
Payer: MEDICARE

## 2024-07-01 VITALS
SYSTOLIC BLOOD PRESSURE: 130 MMHG | BODY MASS INDEX: 24.71 KG/M2 | WEIGHT: 157.8 LBS | HEART RATE: 56 BPM | OXYGEN SATURATION: 97 % | TEMPERATURE: 98.2 F | RESPIRATION RATE: 16 BRPM | DIASTOLIC BLOOD PRESSURE: 66 MMHG

## 2024-07-01 DIAGNOSIS — G25.0 BENIGN ESSENTIAL TREMOR: ICD-10-CM

## 2024-07-01 DIAGNOSIS — N40.0 BENIGN PROSTATIC HYPERPLASIA WITHOUT LOWER URINARY TRACT SYMPTOMS: ICD-10-CM

## 2024-07-01 DIAGNOSIS — E78.5 HYPERLIPIDEMIA, UNSPECIFIED HYPERLIPIDEMIA TYPE: ICD-10-CM

## 2024-07-01 DIAGNOSIS — G20.C PARKINSONISM, UNSPECIFIED PARKINSONISM TYPE (HCC): ICD-10-CM

## 2024-07-01 DIAGNOSIS — E11.9 TYPE 2 DIABETES MELLITUS WITHOUT COMPLICATION, WITHOUT LONG-TERM CURRENT USE OF INSULIN (HCC): ICD-10-CM

## 2024-07-01 DIAGNOSIS — R26.9 GAIT ABNORMALITY: ICD-10-CM

## 2024-07-01 DIAGNOSIS — I65.23 BILATERAL CAROTID ARTERY STENOSIS: Primary | ICD-10-CM

## 2024-07-01 PROCEDURE — G8427 DOCREV CUR MEDS BY ELIG CLIN: HCPCS | Performed by: FAMILY MEDICINE

## 2024-07-01 PROCEDURE — 3044F HG A1C LEVEL LT 7.0%: CPT | Performed by: FAMILY MEDICINE

## 2024-07-01 PROCEDURE — 1036F TOBACCO NON-USER: CPT | Performed by: FAMILY MEDICINE

## 2024-07-01 PROCEDURE — G8420 CALC BMI NORM PARAMETERS: HCPCS | Performed by: FAMILY MEDICINE

## 2024-07-01 PROCEDURE — 99214 OFFICE O/P EST MOD 30 MIN: CPT | Performed by: FAMILY MEDICINE

## 2024-07-01 PROCEDURE — 1123F ACP DISCUSS/DSCN MKR DOCD: CPT | Performed by: FAMILY MEDICINE

## 2024-07-01 RX ORDER — ASPIRIN 81 MG/1
81 TABLET ORAL DAILY
Qty: 90 TABLET | Refills: 3 | COMMUNITY
Start: 2024-07-01

## 2024-07-01 RX ORDER — ATORVASTATIN CALCIUM 20 MG/1
20 TABLET, FILM COATED ORAL DAILY
Qty: 30 TABLET | Refills: 3 | Status: SHIPPED | OUTPATIENT
Start: 2024-07-01

## 2024-07-01 RX ORDER — ATORVASTATIN CALCIUM 20 MG/1
20 TABLET, FILM COATED ORAL DAILY
Qty: 30 TABLET | Refills: 1 | Status: SHIPPED | OUTPATIENT
Start: 2024-07-01

## 2024-07-01 NOTE — PROGRESS NOTES
Patient is here for follow up of vascular screening and diabetes mellitus .  He had vascular screening on 6/21/24, which showed bilateral carotid stenosis per doppler and sinus bradycardia on EKG .  He states he is doing well overall.  When asked he does feel like his gait is off a bit.    Denies falls.  Still playing golf 1d /week - 18 holes .  He goes to the gym 3-4 days /week.  Rides on cart .  Favorite course is the [employer] in [place] and golfing there tomorrow.  Handicap . Shoots in 110-120.  He does not use a cane / walker.  Does not need to get up at night to urinate.  Sleeps 11 pm - 7 am.      Last HgbA1c= 6.2 in 5/24.  He denies arm or leg weakness. No visual or speech difficulties.  Denies fever, chest pain , shortness of breath or palpitations.  No leg swelling.      He has been at [facility] since he was 89 yo.   Exercise equipment is available, which he uses. His brother lives in [place], [place] , and is 95 yo.  He is having a growth removed in neck.          Review of Systems    ROS: All other systems were reviewed and are negative .  Patient's allergies and medications were reviewed.  Patient's past medical, surgical, social , and family history were reviewed.    Wt Readings from Last 3 Encounters:   07/01/24 71.6 kg (157 lb 12.8 oz)   05/09/24 71.2 kg (157 lb)   05/06/24 71.7 kg (158 lb)       OBJECTIVE:  /66 (Site: Left Upper Arm, Position: Sitting)   Pulse 56   Temp 98.2 °F (36.8 °C)   Resp 16   Wt 71.6 kg (157 lb 12.8 oz)   SpO2 97%   BMI 24.71 kg/m²     Physical Exam    General: NAD, cooperative, alert and oriented X 3. Mood / affect is good.good insight. well hydrated.  Neck : no lymphadenopathy, supple, FROM  CV: Regular rate and rhythm , no murmurs/ rub/ gallop. No edema.   Lungs : CTA bilaterally, breathing comfortably  Abdomen: positive bowel sounds, soft , non tender, non distended. No hepatosplenomegaly. No CVA tenderness.  Skin: no rashes. Non tender.

## 2024-08-13 ENCOUNTER — NURSE ONLY (OUTPATIENT)
Age: 89
End: 2024-08-13

## 2024-08-13 DIAGNOSIS — Z78.9 PARTICIPANT IN HEALTH AND WELLNESS PLAN: Primary | ICD-10-CM

## 2024-08-14 NOTE — PROGRESS NOTES
Significant balance impairment evident.  Discussed benefits of formal PT for Balance and Fall Risk reduction.     Peter Lowe, PT

## 2024-10-01 DIAGNOSIS — E11.9 TYPE 2 DIABETES MELLITUS WITHOUT COMPLICATION, WITHOUT LONG-TERM CURRENT USE OF INSULIN (HCC): ICD-10-CM

## 2024-10-01 DIAGNOSIS — I65.23 BILATERAL CAROTID ARTERY STENOSIS: ICD-10-CM

## 2024-10-01 DIAGNOSIS — E78.5 HYPERLIPIDEMIA, UNSPECIFIED HYPERLIPIDEMIA TYPE: ICD-10-CM

## 2024-10-01 LAB
ALBUMIN SERPL-MCNC: 3.8 G/DL (ref 3.4–5)
ALBUMIN/GLOB SERPL: 1.7 {RATIO} (ref 1.1–2.2)
ALP SERPL-CCNC: 68 U/L (ref 40–129)
ALT SERPL-CCNC: 22 U/L (ref 10–40)
ANION GAP SERPL CALCULATED.3IONS-SCNC: 9 MMOL/L (ref 3–16)
AST SERPL-CCNC: 22 U/L (ref 15–37)
BILIRUB SERPL-MCNC: 0.6 MG/DL (ref 0–1)
BUN SERPL-MCNC: 27 MG/DL (ref 7–20)
CALCIUM SERPL-MCNC: 9.5 MG/DL (ref 8.3–10.6)
CHLORIDE SERPL-SCNC: 103 MMOL/L (ref 99–110)
CHOLEST SERPL-MCNC: 197 MG/DL (ref 0–199)
CK SERPL-CCNC: 101 U/L (ref 39–308)
CO2 SERPL-SCNC: 29 MMOL/L (ref 21–32)
CREAT SERPL-MCNC: 1.2 MG/DL (ref 0.8–1.3)
GFR SERPLBLD CREATININE-BSD FMLA CKD-EPI: 54 ML/MIN/{1.73_M2}
GLUCOSE SERPL-MCNC: 168 MG/DL (ref 70–99)
HDLC SERPL-MCNC: 55 MG/DL (ref 40–60)
LDLC SERPL CALC-MCNC: 125 MG/DL
POTASSIUM SERPL-SCNC: 4.4 MMOL/L (ref 3.5–5.1)
PROT SERPL-MCNC: 6.1 G/DL (ref 6.4–8.2)
SODIUM SERPL-SCNC: 141 MMOL/L (ref 136–145)
TRIGL SERPL-MCNC: 87 MG/DL (ref 0–150)
VLDLC SERPL CALC-MCNC: 17 MG/DL

## 2024-10-02 ENCOUNTER — OFFICE VISIT (OUTPATIENT)
Age: 89
End: 2024-10-02

## 2024-10-02 VITALS
HEIGHT: 67 IN | TEMPERATURE: 97.3 F | HEART RATE: 59 BPM | RESPIRATION RATE: 14 BRPM | BODY MASS INDEX: 23.54 KG/M2 | SYSTOLIC BLOOD PRESSURE: 130 MMHG | WEIGHT: 150 LBS | OXYGEN SATURATION: 97 % | DIASTOLIC BLOOD PRESSURE: 72 MMHG

## 2024-10-02 DIAGNOSIS — M50.30 DDD (DEGENERATIVE DISC DISEASE), CERVICAL: ICD-10-CM

## 2024-10-02 DIAGNOSIS — E11.9 TYPE 2 DIABETES MELLITUS WITHOUT COMPLICATION, WITHOUT LONG-TERM CURRENT USE OF INSULIN (HCC): Primary | ICD-10-CM

## 2024-10-02 DIAGNOSIS — G20.C PARKINSONISM, UNSPECIFIED PARKINSONISM TYPE (HCC): ICD-10-CM

## 2024-10-02 DIAGNOSIS — R63.4 WEIGHT LOSS: ICD-10-CM

## 2024-10-02 DIAGNOSIS — E78.5 HYPERLIPIDEMIA, UNSPECIFIED HYPERLIPIDEMIA TYPE: ICD-10-CM

## 2024-10-02 DIAGNOSIS — G25.0 BENIGN ESSENTIAL TREMOR: ICD-10-CM

## 2024-10-02 DIAGNOSIS — N40.0 BENIGN PROSTATIC HYPERPLASIA WITHOUT LOWER URINARY TRACT SYMPTOMS: ICD-10-CM

## 2024-10-02 DIAGNOSIS — I65.23 CAROTID STENOSIS, BILATERAL: ICD-10-CM

## 2024-10-02 DIAGNOSIS — Z23 NEED FOR INFLUENZA VACCINATION: ICD-10-CM

## 2024-10-02 LAB
EST. AVERAGE GLUCOSE BLD GHB EST-MCNC: 134.1 MG/DL
HBA1C MFR BLD: 6.3 %

## 2024-10-02 NOTE — PROGRESS NOTES
SUBJECTIVE:  89 y.o.. male  for follow up of diabetes, hypertension, and hypercholesterolemia. Diabetic Review of Systems - medication compliance: compliant most of the time, diabetic diet compliance: noncompliant some of the time, home glucose monitoring: fasting values range -not checking regularly, further diabetic ROS: no polyuria or polydipsia, no chest pain, dyspnea or TIA's, no numbness, tingling or pain in extremities. Sleeping fine and through the night. He does not awaken to use restroom. Sleeps 11 pm - 7 am.       He wears his hearing aides, including in the office today.      He is still golfing 18 holes once per week on Tuesdays, most week but not the past 2 weeks.  He had a hole in one at [employer] this summer.  Drives the ball about 150 yards.  There are 20 guys - 4 or 5 groups usually.  He is the oldest and one of the worst per him.  His favorite golf courses including [employer] and the [employer] in [place].  Handicap . Shoots in 110-120. He does not use a cane / walker.     He has been trying to eat less.  He feels if he eats too much he feels more bloated. Denies abdominal pain . No dysphagia.  No pain otherwise.  Denies fever, chest pain , shortness of breath or cough.  No palpitations.  Energy is  good overall.  Still doing treadmill 3-4 d/ week - 30 minutes.  Does machine - 10-15 minutes elliptical.  He was unaware of weight loss.     He has done fine with balance on the golf course.  He did fall once or twice  , but last fell > 3 months ago.    He is going to visit his brother , 95 yo , in [place], [place] who is with his son/ nephew currently due to medical treatments. He has not seen him for a couple of years and leaves on Friday and returns on Monday .  His family in [place] encouraged him to get a COVID shot in preparation, which he did.  A niece is flying with him from [place] with a connecting flight in [place].      Current Outpatient Medications   Medication Sig Dispense

## 2024-10-30 PROBLEM — I25.2 HISTORY OF NON-ST ELEVATION MYOCARDIAL INFARCTION (NSTEMI): Status: ACTIVE | Noted: 2024-10-01

## 2024-10-30 PROBLEM — E53.8 VITAMIN B12 DEFICIENCY: Status: ACTIVE | Noted: 2024-10-01

## 2024-10-30 PROBLEM — I48.91 ATRIAL FIBRILLATION (HCC): Status: ACTIVE | Noted: 2024-10-01

## 2024-10-30 PROBLEM — D50.9 IRON DEFICIENCY ANEMIA: Status: ACTIVE | Noted: 2024-10-01

## 2024-10-30 PROBLEM — D69.6 THROMBOCYTOPENIA (HCC): Status: ACTIVE | Noted: 2024-10-01

## 2024-10-30 PROBLEM — I21.4 NSTEMI (NON-ST ELEVATED MYOCARDIAL INFARCTION) (HCC): Status: ACTIVE | Noted: 2024-10-01

## 2024-10-30 PROBLEM — I42.9 CARDIOMYOPATHY (HCC): Status: ACTIVE | Noted: 2024-10-01

## 2024-10-30 NOTE — PROGRESS NOTES
take and therefore is not currently doing so.   Ferrous sulfate 325 mg M,W, F per hematology recommendation, but appears to be taking daily . Some constipation.      He was to continue Finasteride 5 mg qd .  He is off Terazosin.  .      His last had a bowel movement on Tuesday .  His bowel movement are less regular.  Previous to hospitalization, his bowel movements were q 1-2 per day. Denies black stools or rectal bleeding.   Denies abdominal pain .  He notices he tires more easily . He has a cane but is not using it much.  He finds it a bit more difficult to get up from seated, but is able to do so.  His son accompanies him today at his appointment .     TART taking these medications     Details   acetaminophen (TYLENOL) 325 MG tablet Take 2 tablets (650 mg total) by mouth every 4 hours as needed for up to 14 days., Starting Sun 10/20/2024, Until Sun 11/3/2024 at 2359, No Print       amiodarone (PACERONE) 200 MG tablet Multiple Dosages:Starting Sun 10/20/2024, Until Thu 10/31/2024 at 2359, THEN Starting Fri 11/1/2024, Until Mon 12/30/2024 at 2359Take 1 tablet (200 mg total) by mouth 2 times a day for 12 days, THEN 1 tablet (200 mg total) daily for 60 days., No Print       apixaban (ELIQUIS) 5 mg Tab Take 1 tablet (5 mg total) by mouth 2 times a day., Starting Sun 10/20/2024, No Print       aspirin 81 MG EC tablet Take 1 tablet (81 mg total) by mouth daily with breakfast., Starting Mon 10/21/2024, No Print       atorvastatin (LIPITOR) 80 MG tablet Take 1 tablet (80 mg total) by mouth at bedtime., Starting Sun 10/20/2024, No Print       cyanocobalamin (VITAMIN B-12) 1000 MCG tablet Take 1 tablet (1,000 mcg total) by mouth daily., Starting Mon 10/21/2024, No Print       ferrous sulfate 325 (65 FE) MG tablet Take 1 tablet (325 mg total) by mouth every Monday, Wednesday, and Friday., Starting Mon 10/21/2024, No Print       magnesium hydroxide (MILK OF MAGNESIA) 400 mg/5 mL Susp Take 15 mLs by mouth 2 times a day as

## 2024-10-31 ENCOUNTER — OFFICE VISIT (OUTPATIENT)
Age: 89
End: 2024-10-31

## 2024-10-31 VITALS
BODY MASS INDEX: 22.55 KG/M2 | WEIGHT: 144 LBS | TEMPERATURE: 98 F | HEART RATE: 68 BPM | DIASTOLIC BLOOD PRESSURE: 76 MMHG | OXYGEN SATURATION: 97 % | RESPIRATION RATE: 16 BRPM | SYSTOLIC BLOOD PRESSURE: 132 MMHG

## 2024-10-31 DIAGNOSIS — D69.6 THROMBOCYTOPENIA (HCC): ICD-10-CM

## 2024-10-31 DIAGNOSIS — I42.9 CARDIOMYOPATHY, UNSPECIFIED TYPE (HCC): Primary | ICD-10-CM

## 2024-10-31 DIAGNOSIS — E78.5 HYPERLIPIDEMIA, UNSPECIFIED HYPERLIPIDEMIA TYPE: ICD-10-CM

## 2024-10-31 DIAGNOSIS — R63.4 WEIGHT LOSS: ICD-10-CM

## 2024-10-31 DIAGNOSIS — G20.C PARKINSONISM, UNSPECIFIED PARKINSONISM TYPE (HCC): ICD-10-CM

## 2024-10-31 DIAGNOSIS — G25.0 BENIGN ESSENTIAL TREMOR: ICD-10-CM

## 2024-10-31 DIAGNOSIS — E53.8 VITAMIN B12 DEFICIENCY: ICD-10-CM

## 2024-10-31 DIAGNOSIS — E11.9 TYPE 2 DIABETES MELLITUS WITHOUT COMPLICATION, WITHOUT LONG-TERM CURRENT USE OF INSULIN (HCC): ICD-10-CM

## 2024-10-31 DIAGNOSIS — D50.9 IRON DEFICIENCY ANEMIA, UNSPECIFIED IRON DEFICIENCY ANEMIA TYPE: ICD-10-CM

## 2024-10-31 DIAGNOSIS — K59.00 CONSTIPATION, UNSPECIFIED CONSTIPATION TYPE: ICD-10-CM

## 2024-10-31 DIAGNOSIS — I25.2 HISTORY OF NON-ST ELEVATION MYOCARDIAL INFARCTION (NSTEMI): ICD-10-CM

## 2024-10-31 DIAGNOSIS — N40.0 BENIGN PROSTATIC HYPERPLASIA WITHOUT LOWER URINARY TRACT SYMPTOMS: ICD-10-CM

## 2024-10-31 DIAGNOSIS — I48.0 PAROXYSMAL ATRIAL FIBRILLATION (HCC): ICD-10-CM

## 2024-10-31 DIAGNOSIS — I65.23 CAROTID STENOSIS, BILATERAL: ICD-10-CM

## 2024-10-31 RX ORDER — PANTOPRAZOLE SODIUM 40 MG/1
40 TABLET, DELAYED RELEASE ORAL DAILY
COMMUNITY

## 2024-10-31 RX ORDER — SPIRONOLACTONE 25 MG/1
25 TABLET ORAL DAILY
COMMUNITY

## 2024-10-31 RX ORDER — METOPROLOL SUCCINATE 25 MG/1
12.5 TABLET, EXTENDED RELEASE ORAL DAILY
COMMUNITY

## 2024-10-31 RX ORDER — AMIODARONE HYDROCHLORIDE 200 MG/1
200 TABLET ORAL DAILY
COMMUNITY

## 2024-10-31 RX ORDER — ACETAMINOPHEN 325 MG/1
650 TABLET ORAL EVERY 4 HOURS PRN
COMMUNITY
Start: 2024-10-20 | End: 2024-11-03

## 2024-10-31 RX ORDER — LANOLIN ALCOHOL/MO/W.PET/CERES
3 CREAM (GRAM) TOPICAL NIGHTLY
COMMUNITY
Start: 2024-10-20

## 2024-10-31 RX ORDER — APIXABAN 5 MG/1
5 TABLET, FILM COATED ORAL 2 TIMES DAILY
COMMUNITY

## 2024-10-31 NOTE — PATIENT INSTRUCTIONS
Glucerna is the diabetic protein supplement or Core Power Elite -  1 bottle /day - between meals.     Schedule a follow up appointment with Dr. Kapoor -   [facility]   [address] [place] [address] [phone]    Consider Coreg versus Metoprolol given decreased EF ( 30-35%).  Previously was on Propranolol for tremors and helped.  ( All are beta blockers).     Consider - ARB and /or SGLT-2 .   _________________________________________________________________________________    Consider Home Health care , including nurse, Occupational Therapy , Physical Therapy , Health Aide.     Consider cardiac rehab- [facility].     Consider Xarelto once per day  if cheaper versus Eliquis twice per day .

## 2024-11-01 ENCOUNTER — TELEPHONE (OUTPATIENT)
Age: 89
End: 2024-11-01

## 2024-11-01 NOTE — TELEPHONE ENCOUNTER
Spoke with Dr. Kapoor's office, they will be getting back to you on your after hours line today. Faxing EKG results from yesterday to [phone].

## 2024-11-01 NOTE — TELEPHONE ENCOUNTER
Please fax recent EKG to Dr. Kapoor , cardiologist at [facility]- sinus bradycardia and not atrial fibrillation.  I would like to speak to either cardiologist, Dr. Kapoor or his nurse / NP  about the patient and his medications , including Coreg versus Metoprolol .  Please feel free to give my number.

## 2024-11-08 NOTE — TELEPHONE ENCOUNTER
I called back on phone # given [phone] and speak with Bette . She is going to let cardiologist ( who specializes in heart failure ) seeing the patient on 11/12/24 in follow up know about question of switching to Coreg bid versus Metoprolol .  In addition the Amiodarone ongoing will be addressed and likely Eliquis bid will be continued but will also be addressed.  Informed he was NSR in my office on 10/31/24.

## 2024-12-06 ENCOUNTER — OFFICE VISIT (OUTPATIENT)
Age: 88
End: 2024-12-06

## 2024-12-06 VITALS
OXYGEN SATURATION: 99 % | TEMPERATURE: 97.7 F | BODY MASS INDEX: 23.13 KG/M2 | RESPIRATION RATE: 16 BRPM | SYSTOLIC BLOOD PRESSURE: 130 MMHG | HEART RATE: 69 BPM | HEIGHT: 67 IN | DIASTOLIC BLOOD PRESSURE: 74 MMHG | WEIGHT: 147.4 LBS

## 2024-12-06 DIAGNOSIS — D50.9 IRON DEFICIENCY ANEMIA, UNSPECIFIED IRON DEFICIENCY ANEMIA TYPE: ICD-10-CM

## 2024-12-06 DIAGNOSIS — G20.C PARKINSONISM, UNSPECIFIED PARKINSONISM TYPE (HCC): ICD-10-CM

## 2024-12-06 DIAGNOSIS — K59.00 CONSTIPATION, UNSPECIFIED CONSTIPATION TYPE: ICD-10-CM

## 2024-12-06 DIAGNOSIS — R63.4 WEIGHT LOSS: ICD-10-CM

## 2024-12-06 DIAGNOSIS — E78.5 HYPERLIPIDEMIA, UNSPECIFIED HYPERLIPIDEMIA TYPE: ICD-10-CM

## 2024-12-06 DIAGNOSIS — E11.9 TYPE 2 DIABETES MELLITUS WITHOUT COMPLICATION, WITHOUT LONG-TERM CURRENT USE OF INSULIN (HCC): ICD-10-CM

## 2024-12-06 DIAGNOSIS — M50.30 DDD (DEGENERATIVE DISC DISEASE), CERVICAL: ICD-10-CM

## 2024-12-06 DIAGNOSIS — D69.6 THROMBOCYTOPENIA (HCC): ICD-10-CM

## 2024-12-06 DIAGNOSIS — G25.0 BENIGN ESSENTIAL TREMOR: ICD-10-CM

## 2024-12-06 DIAGNOSIS — R42 DIZZINESS: ICD-10-CM

## 2024-12-06 DIAGNOSIS — I42.9 CARDIOMYOPATHY, UNSPECIFIED TYPE (HCC): Primary | ICD-10-CM

## 2024-12-06 DIAGNOSIS — I48.0 PAROXYSMAL ATRIAL FIBRILLATION (HCC): ICD-10-CM

## 2024-12-06 DIAGNOSIS — I25.2 HISTORY OF NON-ST ELEVATION MYOCARDIAL INFARCTION (NSTEMI): ICD-10-CM

## 2024-12-06 DIAGNOSIS — I65.23 CAROTID STENOSIS, BILATERAL: ICD-10-CM

## 2024-12-06 RX ORDER — VALSARTAN 40 MG/1
TABLET ORAL
Qty: 45 TABLET | Refills: 1
Start: 2024-12-06

## 2024-12-06 RX ORDER — ATORVASTATIN CALCIUM 80 MG/1
80 TABLET, FILM COATED ORAL DAILY
Qty: 90 TABLET | Refills: 1
Start: 2024-12-06

## 2024-12-06 SDOH — ECONOMIC STABILITY: FOOD INSECURITY: WITHIN THE PAST 12 MONTHS, YOU WORRIED THAT YOUR FOOD WOULD RUN OUT BEFORE YOU GOT MONEY TO BUY MORE.: NEVER TRUE

## 2024-12-06 SDOH — ECONOMIC STABILITY: FOOD INSECURITY: WITHIN THE PAST 12 MONTHS, THE FOOD YOU BOUGHT JUST DIDN'T LAST AND YOU DIDN'T HAVE MONEY TO GET MORE.: NEVER TRUE

## 2024-12-06 SDOH — ECONOMIC STABILITY: INCOME INSECURITY: HOW HARD IS IT FOR YOU TO PAY FOR THE VERY BASICS LIKE FOOD, HOUSING, MEDICAL CARE, AND HEATING?: NOT HARD AT ALL

## 2024-12-06 NOTE — PATIENT INSTRUCTIONS
Drink 64 oz of water per day.  Add fiber supplement powder , such as Citrucel - once daily     Schedule follow up with cardiologist after cardiac monitor is completed and by 2/1/25.

## 2024-12-06 NOTE — PROGRESS NOTES
without complication, without long-term current use of insulin ([facility])  - HgbA1c= 6.3 in 10/24.    - Controlled.  Continue dietary/ lifestyle modifications, including decreased concentrated sweets and carbohydrates.     9. Carotid stenosis, bilateral  - Stable. Continue Lipitor 80 mg qd and Aspirin 81 mg qd. .   - atorvastatin (LIPITOR) 80 MG tablet; Take 1 tablet by mouth daily  Dispense: 90 tablet; Refill: 1    10. Weight loss  - Stable.  ( 3+ lb increase) .  Monitor.     11. Constipation, unspecified constipation type  - Push fluids - water and daily dietary fiber.  - Add Citrucel powder - 1 drink per day . Monitor.      12. Dizziness  - Likely multi-factorial , including decrease in EF, medications, age, deconditioning , Parkinson's , etc.   - Discussed Physical Therapy at [facility] or elsewhere, but he prefers to hold currently.    13. Parkinsonism, unspecified Parkinsonism type (HCC)  - Stable.     14. DDD (degenerative disc disease), cervical  - Stable. Moist heat . ROM exercises.  Modify activities.      Follow up in 2- 3 months/ prn.

## 2025-01-15 ENCOUNTER — TELEPHONE (OUTPATIENT)
Age: 89
End: 2025-01-15
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
## 2025-03-03 ENCOUNTER — OFFICE VISIT (OUTPATIENT)
Age: 89
End: 2025-03-03

## 2025-03-03 VITALS
HEART RATE: 72 BPM | RESPIRATION RATE: 16 BRPM | TEMPERATURE: 97 F | SYSTOLIC BLOOD PRESSURE: 126 MMHG | OXYGEN SATURATION: 99 % | BODY MASS INDEX: 22.96 KG/M2 | DIASTOLIC BLOOD PRESSURE: 68 MMHG | HEIGHT: 67 IN | WEIGHT: 146.3 LBS

## 2025-03-03 DIAGNOSIS — I48.0 PAROXYSMAL ATRIAL FIBRILLATION (HCC): ICD-10-CM

## 2025-03-03 DIAGNOSIS — D69.6 THROMBOCYTOPENIA: ICD-10-CM

## 2025-03-03 DIAGNOSIS — I65.23 CAROTID STENOSIS, BILATERAL: ICD-10-CM

## 2025-03-03 DIAGNOSIS — N40.0 BENIGN PROSTATIC HYPERPLASIA WITHOUT LOWER URINARY TRACT SYMPTOMS: ICD-10-CM

## 2025-03-03 DIAGNOSIS — K59.00 CONSTIPATION, UNSPECIFIED CONSTIPATION TYPE: ICD-10-CM

## 2025-03-03 DIAGNOSIS — R63.4 WEIGHT LOSS: ICD-10-CM

## 2025-03-03 DIAGNOSIS — I25.2 HISTORY OF NON-ST ELEVATION MYOCARDIAL INFARCTION (NSTEMI): ICD-10-CM

## 2025-03-03 DIAGNOSIS — G25.0 BENIGN ESSENTIAL TREMOR: ICD-10-CM

## 2025-03-03 DIAGNOSIS — D50.9 IRON DEFICIENCY ANEMIA, UNSPECIFIED IRON DEFICIENCY ANEMIA TYPE: ICD-10-CM

## 2025-03-03 DIAGNOSIS — E78.5 HYPERLIPIDEMIA, UNSPECIFIED HYPERLIPIDEMIA TYPE: ICD-10-CM

## 2025-03-03 DIAGNOSIS — Z71.89 ACP (ADVANCE CARE PLANNING): ICD-10-CM

## 2025-03-03 DIAGNOSIS — E11.9 TYPE 2 DIABETES MELLITUS WITHOUT COMPLICATION, WITHOUT LONG-TERM CURRENT USE OF INSULIN (HCC): Primary | ICD-10-CM

## 2025-03-03 DIAGNOSIS — G20.C PARKINSONISM, UNSPECIFIED PARKINSONISM TYPE (HCC): ICD-10-CM

## 2025-03-03 DIAGNOSIS — M50.30 DDD (DEGENERATIVE DISC DISEASE), CERVICAL: ICD-10-CM

## 2025-03-03 DIAGNOSIS — E53.8 VITAMIN B12 DEFICIENCY: ICD-10-CM

## 2025-03-03 DIAGNOSIS — I42.9 CARDIOMYOPATHY, UNSPECIFIED TYPE (HCC): ICD-10-CM

## 2025-03-03 DIAGNOSIS — R29.818 DIFFICULTY BALANCING: ICD-10-CM

## 2025-03-03 RX ORDER — PROPRANOLOL HYDROCHLORIDE 60 MG/1
CAPSULE, EXTENDED RELEASE ORAL
COMMUNITY
Start: 2024-12-01

## 2025-03-03 RX ORDER — PROPRANOLOL HCL 20 MG
TABLET ORAL
COMMUNITY
Start: 2025-01-24

## 2025-03-03 SDOH — ECONOMIC STABILITY: FOOD INSECURITY: WITHIN THE PAST 12 MONTHS, YOU WORRIED THAT YOUR FOOD WOULD RUN OUT BEFORE YOU GOT MONEY TO BUY MORE.: NEVER TRUE

## 2025-03-03 SDOH — ECONOMIC STABILITY: FOOD INSECURITY: WITHIN THE PAST 12 MONTHS, THE FOOD YOU BOUGHT JUST DIDN'T LAST AND YOU DIDN'T HAVE MONEY TO GET MORE.: NEVER TRUE

## 2025-03-03 ASSESSMENT — PATIENT HEALTH QUESTIONNAIRE - PHQ9
SUM OF ALL RESPONSES TO PHQ QUESTIONS 1-9: 0
1. LITTLE INTEREST OR PLEASURE IN DOING THINGS: NOT AT ALL
2. FEELING DOWN, DEPRESSED OR HOPELESS: NOT AT ALL
SUM OF ALL RESPONSES TO PHQ QUESTIONS 1-9: 0

## 2025-03-03 NOTE — PATIENT INSTRUCTIONS
Add Metamucil/  Citrucel to 8-12 oz /water daily to help with constipation.  Consider prune juice - once daily by 2 pm.     Core Power Elite - 1 bottle per day .    Advance Care Planning     Advance Care Planning opens a door to talk about and write down your wishes before a sudden accident or illness.  Make your goals, values, and preferences known.     This puts you in the ’s seat and helps others know what matters most to you so they won’t have to guess.      Where can you learn more?    Go to [email or web address]   to learn how to:    Name someone you trust to make healthcare decisions for you, only if you can’t. (Healthcare Power of )    Document your wishes for care if you were seriously ill and not expected to recover or are approaching end of life. (Advance Directive or Living Will)    The same page can be found using the QR code below.

## 2025-03-03 NOTE — PROGRESS NOTES
designating a trusted capable adult as an Agent (or Health Care Power of ) to make health care decisions for the patient if the patient becomes unable due to incapacity. Patient was asked to complete advance directive forms, if not already done, and to provide a dated, signed and witnessed (or notarized) copy, per the forms' requirements, to the practice office.    Follow up 3 months/ prn.

## 2025-06-30 ENCOUNTER — OFFICE VISIT (OUTPATIENT)
Age: 89
End: 2025-06-30

## 2025-06-30 VITALS
WEIGHT: 149.4 LBS | DIASTOLIC BLOOD PRESSURE: 76 MMHG | HEIGHT: 67 IN | HEART RATE: 68 BPM | RESPIRATION RATE: 16 BRPM | SYSTOLIC BLOOD PRESSURE: 118 MMHG | TEMPERATURE: 97.8 F | OXYGEN SATURATION: 98 % | BODY MASS INDEX: 23.45 KG/M2

## 2025-06-30 DIAGNOSIS — E78.5 HYPERLIPIDEMIA, UNSPECIFIED HYPERLIPIDEMIA TYPE: ICD-10-CM

## 2025-06-30 DIAGNOSIS — D50.9 IRON DEFICIENCY ANEMIA, UNSPECIFIED IRON DEFICIENCY ANEMIA TYPE: ICD-10-CM

## 2025-06-30 DIAGNOSIS — R63.4 WEIGHT LOSS: ICD-10-CM

## 2025-06-30 DIAGNOSIS — I48.0 PAROXYSMAL ATRIAL FIBRILLATION (HCC): ICD-10-CM

## 2025-06-30 DIAGNOSIS — E11.9 TYPE 2 DIABETES MELLITUS WITHOUT COMPLICATION, WITHOUT LONG-TERM CURRENT USE OF INSULIN (HCC): ICD-10-CM

## 2025-06-30 DIAGNOSIS — I25.2 HISTORY OF NON-ST ELEVATION MYOCARDIAL INFARCTION (NSTEMI): ICD-10-CM

## 2025-06-30 DIAGNOSIS — I65.23 CAROTID STENOSIS, BILATERAL: ICD-10-CM

## 2025-06-30 DIAGNOSIS — D69.6 THROMBOCYTOPENIA: ICD-10-CM

## 2025-06-30 DIAGNOSIS — K59.00 CONSTIPATION, UNSPECIFIED CONSTIPATION TYPE: ICD-10-CM

## 2025-06-30 DIAGNOSIS — M50.30 DDD (DEGENERATIVE DISC DISEASE), CERVICAL: ICD-10-CM

## 2025-06-30 DIAGNOSIS — E11.9 TYPE 2 DIABETES MELLITUS WITHOUT COMPLICATION, WITHOUT LONG-TERM CURRENT USE OF INSULIN (HCC): Primary | ICD-10-CM

## 2025-06-30 DIAGNOSIS — G20.C PARKINSONISM, UNSPECIFIED PARKINSONISM TYPE (HCC): ICD-10-CM

## 2025-06-30 DIAGNOSIS — I42.9 CARDIOMYOPATHY, UNSPECIFIED TYPE (HCC): ICD-10-CM

## 2025-06-30 RX ORDER — VALSARTAN 40 MG/1
TABLET ORAL
Qty: 90 TABLET | Refills: 3
Start: 2025-06-30

## 2025-06-30 RX ORDER — PROPRANOLOL HCL 20 MG
20 TABLET ORAL
Qty: 90 TABLET | Refills: 3
Start: 2025-06-30

## 2025-06-30 NOTE — PROGRESS NOTES
SUBJECTIVE:  89 y.o.. male  for follow up of diabetes, hypertension, and hypercholesterolemia. Diabetic Review of Systems - medication compliance: compliant most of the time, diabetic diet compliance: noncompliant some of the time, home glucose monitoring: fasting values range - rarely checks, further diabetic ROS: no polyuria or polydipsia, no chest pain, dyspnea or TIA's, no numbness, tingling or pain in extremities, last eye exam approximately > 1 year ago.     He is having some problems with distance vision , including with tracking golf balls when going to driving range. He has not golfed otherwise to date.  He is a bit apprehensive about golfing, but does have offers for people to pick him up and who want him to golf with them.  He feels his appetite and balance are okay overall. He did fine with swinging golf club and on treadmill with balance.  He cooks most of his meals and goes grocery shopping with son on Saturdays usually for the week.  He misses driving but understands it is not a good idea.  He now gets his haircut at [facility].      He goes to gym 4-5 days / week - treadmill 30 minutes with resistance machine on arms.  His pace is the same as prior to hospitalization.      He has a cardiologist appointment on 7/10/25 with Dr. Kenroy DEY.  He was switched to Metoprolol but switched back to Propranolol due to significant increase in tremors.  He had a repeat Echo in 6/25 , which showed EF 25-30% and similar to prior in 12/24.  His Event monitors in 12/24 and 3/25 showed NSR with bradycardia.   He saw NP with Dr. Rivera, cardiologist , on 5/12/25.      Current Outpatient Medications   Medication Sig Dispense Refill    propranolol (INDERAL) 20 MG immediate release tablet Take 1 tablet by mouth daily (before lunch) 90 tablet 3    valsartan (DIOVAN) 40 MG tablet Pt takes full tablet now 90 tablet 3    propranolol (INDERAL LA) 60 MG extended release capsule       atorvastatin (LIPITOR) 80 MG tablet

## 2025-06-30 NOTE — PATIENT INSTRUCTIONS
Schedule eye exam .    Do you need to be on Eliquis given NSR ( normal sinus rhythm) on Event monitor in 3/25 and 12/24.   Consider Coreg for decreased EF on echo - 30%  ( normal = 50-60%)- on Propranolol in part due to tremors

## 2025-07-01 ENCOUNTER — RESULTS FOLLOW-UP (OUTPATIENT)
Age: 89
End: 2025-07-01

## 2025-07-01 DIAGNOSIS — D64.9 ANEMIA, UNSPECIFIED TYPE: ICD-10-CM

## 2025-07-01 DIAGNOSIS — D64.9 ANEMIA, UNSPECIFIED TYPE: Primary | ICD-10-CM

## 2025-07-01 LAB
ALBUMIN SERPL-MCNC: 4.1 G/DL (ref 3.4–5)
ALBUMIN/GLOB SERPL: 1.7 {RATIO} (ref 1.1–2.2)
ALP SERPL-CCNC: 81 U/L (ref 40–129)
ALT SERPL-CCNC: 31 U/L (ref 10–40)
ANION GAP SERPL CALCULATED.3IONS-SCNC: 8 MMOL/L (ref 3–16)
AST SERPL-CCNC: 27 U/L (ref 15–37)
BILIRUB SERPL-MCNC: 0.5 MG/DL (ref 0–1)
BUN SERPL-MCNC: 35 MG/DL (ref 7–20)
CALCIUM SERPL-MCNC: 9.3 MG/DL (ref 8.3–10.6)
CHLORIDE SERPL-SCNC: 105 MMOL/L (ref 99–110)
CHOLEST SERPL-MCNC: 126 MG/DL (ref 0–199)
CO2 SERPL-SCNC: 28 MMOL/L (ref 21–32)
CREAT SERPL-MCNC: 1.2 MG/DL (ref 0.8–1.3)
DEPRECATED RDW RBC AUTO: 14.3 % (ref 12.4–15.4)
EST. AVERAGE GLUCOSE BLD GHB EST-MCNC: 134.1 MG/DL
FERRITIN SERPL IA-MCNC: 354 NG/ML (ref 30–400)
GFR SERPLBLD CREATININE-BSD FMLA CKD-EPI: 54 ML/MIN/{1.73_M2}
GLUCOSE SERPL-MCNC: 108 MG/DL (ref 70–99)
HBA1C MFR BLD: 6.3 %
HCT VFR BLD AUTO: 33.3 % (ref 40.5–52.5)
HDLC SERPL-MCNC: 54 MG/DL (ref 40–60)
HGB BLD-MCNC: 11.3 G/DL (ref 13.5–17.5)
IRON SATN MFR SERPL: 43 % (ref 20–50)
IRON SERPL-MCNC: 102 UG/DL (ref 59–158)
LDLC SERPL CALC-MCNC: 57 MG/DL
MCH RBC QN AUTO: 34.9 PG (ref 26–34)
MCHC RBC AUTO-ENTMCNC: 33.9 G/DL (ref 31–36)
MCV RBC AUTO: 102.9 FL (ref 80–100)
PLATELET # BLD AUTO: 148 K/UL (ref 135–450)
PMV BLD AUTO: 10.3 FL (ref 5–10.5)
POTASSIUM SERPL-SCNC: 5.1 MMOL/L (ref 3.5–5.1)
PROT SERPL-MCNC: 6.5 G/DL (ref 6.4–8.2)
RBC # BLD AUTO: 3.23 M/UL (ref 4.2–5.9)
SODIUM SERPL-SCNC: 141 MMOL/L (ref 136–145)
TIBC SERPL-MCNC: 236 UG/DL (ref 260–445)
TRIGL SERPL-MCNC: 73 MG/DL (ref 0–150)
TSH SERPL DL<=0.005 MIU/L-ACNC: 1.46 UIU/ML (ref 0.27–4.2)
VIT B12 SERPL-MCNC: 388 PG/ML (ref 211–911)
VLDLC SERPL CALC-MCNC: 15 MG/DL
WBC # BLD AUTO: 9.1 K/UL (ref 4–11)

## 2025-07-02 ENCOUNTER — RESULTS FOLLOW-UP (OUTPATIENT)
Age: 89
End: 2025-07-02